# Patient Record
Sex: FEMALE | Race: OTHER | NOT HISPANIC OR LATINO
[De-identification: names, ages, dates, MRNs, and addresses within clinical notes are randomized per-mention and may not be internally consistent; named-entity substitution may affect disease eponyms.]

---

## 2017-01-31 ENCOUNTER — RESULT REVIEW (OUTPATIENT)
Age: 22
End: 2017-01-31

## 2017-02-01 ENCOUNTER — OUTPATIENT (OUTPATIENT)
Dept: OUTPATIENT SERVICES | Facility: HOSPITAL | Age: 22
LOS: 1 days | End: 2017-02-01
Payer: MEDICAID

## 2017-02-01 ENCOUNTER — APPOINTMENT (OUTPATIENT)
Dept: OBGYN | Facility: CLINIC | Age: 22
End: 2017-02-01

## 2017-02-01 DIAGNOSIS — F12.90 CANNABIS USE, UNSPECIFIED, UNCOMPLICATED: ICD-10-CM

## 2017-02-01 DIAGNOSIS — N76.0 ACUTE VAGINITIS: ICD-10-CM

## 2017-02-01 PROCEDURE — G0463: CPT | Mod: 25

## 2017-02-01 PROCEDURE — 76857 US EXAM PELVIC LIMITED: CPT

## 2017-02-13 ENCOUNTER — OTHER (OUTPATIENT)
Age: 22
End: 2017-02-13

## 2017-02-14 DIAGNOSIS — Z3A.01 LESS THAN 8 WEEKS GESTATION OF PREGNANCY: ICD-10-CM

## 2017-03-01 ENCOUNTER — APPOINTMENT (OUTPATIENT)
Dept: OBGYN | Facility: CLINIC | Age: 22
End: 2017-03-01

## 2017-03-08 ENCOUNTER — LABORATORY RESULT (OUTPATIENT)
Age: 22
End: 2017-03-08

## 2017-03-08 ENCOUNTER — APPOINTMENT (OUTPATIENT)
Dept: OBGYN | Facility: CLINIC | Age: 22
End: 2017-03-08

## 2017-03-08 ENCOUNTER — OUTPATIENT (OUTPATIENT)
Dept: OUTPATIENT SERVICES | Facility: HOSPITAL | Age: 22
LOS: 1 days | End: 2017-03-08
Payer: MEDICAID

## 2017-03-08 VITALS — DIASTOLIC BLOOD PRESSURE: 80 MMHG | SYSTOLIC BLOOD PRESSURE: 120 MMHG | WEIGHT: 182 LBS

## 2017-03-08 DIAGNOSIS — N76.0 ACUTE VAGINITIS: ICD-10-CM

## 2017-03-08 PROCEDURE — 86592 SYPHILIS TEST NON-TREP QUAL: CPT

## 2017-03-08 PROCEDURE — 87389 HIV-1 AG W/HIV-1&-2 AB AG IA: CPT

## 2017-03-08 PROCEDURE — 87340 HEPATITIS B SURFACE AG IA: CPT

## 2017-03-08 PROCEDURE — G0463: CPT

## 2017-03-08 PROCEDURE — 84443 ASSAY THYROID STIM HORMONE: CPT

## 2017-03-09 LAB
HBV SURFACE AG SER-ACNC: SIGNIFICANT CHANGE UP
HIV 1+2 AB+HIV1 P24 AG SERPL QL IA: SIGNIFICANT CHANGE UP
RPR SERPL-ACNC: SIGNIFICANT CHANGE UP

## 2017-03-10 LAB — TSH SERPL-MCNC: 1.55 UIU/ML — SIGNIFICANT CHANGE UP (ref 0.27–4.2)

## 2017-03-13 DIAGNOSIS — Z33.2 ENCOUNTER FOR ELECTIVE TERMINATION OF PREGNANCY: ICD-10-CM

## 2017-03-13 DIAGNOSIS — Z33.1 PREGNANT STATE, INCIDENTAL: ICD-10-CM

## 2017-03-17 ENCOUNTER — OUTPATIENT (OUTPATIENT)
Dept: OUTPATIENT SERVICES | Facility: HOSPITAL | Age: 22
LOS: 1 days | End: 2017-03-17
Payer: MEDICAID

## 2017-03-17 VITALS
HEART RATE: 75 BPM | DIASTOLIC BLOOD PRESSURE: 77 MMHG | WEIGHT: 181 LBS | SYSTOLIC BLOOD PRESSURE: 119 MMHG | HEIGHT: 60 IN | RESPIRATION RATE: 16 BRPM | TEMPERATURE: 99 F | OXYGEN SATURATION: 98 %

## 2017-03-17 DIAGNOSIS — Z32.2 ENCOUNTER FOR CHILDBIRTH INSTRUCTION: ICD-10-CM

## 2017-03-17 DIAGNOSIS — Z33.2 ENCOUNTER FOR ELECTIVE TERMINATION OF PREGNANCY: ICD-10-CM

## 2017-03-17 DIAGNOSIS — Z01.818 ENCOUNTER FOR OTHER PREPROCEDURAL EXAMINATION: ICD-10-CM

## 2017-03-17 LAB
BLD GP AB SCN SERPL QL: NEGATIVE — SIGNIFICANT CHANGE UP
HCT VFR BLD CALC: 39.4 % — SIGNIFICANT CHANGE UP (ref 34.5–45)
HGB BLD-MCNC: 13.4 G/DL — SIGNIFICANT CHANGE UP (ref 11.5–15.5)
MCHC RBC-ENTMCNC: 28.5 PG — SIGNIFICANT CHANGE UP (ref 27–34)
MCHC RBC-ENTMCNC: 34 GM/DL — SIGNIFICANT CHANGE UP (ref 32–36)
MCV RBC AUTO: 83.7 FL — SIGNIFICANT CHANGE UP (ref 80–100)
PLATELET # BLD AUTO: 226 K/UL — SIGNIFICANT CHANGE UP (ref 150–400)
RBC # BLD: 4.71 M/UL — SIGNIFICANT CHANGE UP (ref 3.8–5.2)
RBC # FLD: 13.2 % — SIGNIFICANT CHANGE UP (ref 10.3–14.5)
RH IG SCN BLD-IMP: POSITIVE — SIGNIFICANT CHANGE UP
WBC # BLD: 11.01 K/UL — HIGH (ref 3.8–10.5)
WBC # FLD AUTO: 11.01 K/UL — HIGH (ref 3.8–10.5)

## 2017-03-17 PROCEDURE — 86900 BLOOD TYPING SEROLOGIC ABO: CPT

## 2017-03-17 PROCEDURE — 86901 BLOOD TYPING SEROLOGIC RH(D): CPT

## 2017-03-17 PROCEDURE — 86850 RBC ANTIBODY SCREEN: CPT

## 2017-03-17 PROCEDURE — 85027 COMPLETE CBC AUTOMATED: CPT

## 2017-03-17 RX ORDER — CELECOXIB 200 MG/1
200 CAPSULE ORAL ONCE
Qty: 0 | Refills: 0 | Status: COMPLETED | OUTPATIENT
Start: 2017-03-20 | End: 2017-03-20

## 2017-03-17 RX ORDER — SODIUM CHLORIDE 9 MG/ML
3 INJECTION INTRAMUSCULAR; INTRAVENOUS; SUBCUTANEOUS EVERY 8 HOURS
Qty: 0 | Refills: 0 | Status: DISCONTINUED | OUTPATIENT
Start: 2017-03-20 | End: 2017-04-04

## 2017-03-17 RX ORDER — ACETAMINOPHEN 500 MG
975 TABLET ORAL ONCE
Qty: 0 | Refills: 0 | Status: COMPLETED | OUTPATIENT
Start: 2017-03-20 | End: 2017-03-20

## 2017-03-17 RX ORDER — LIDOCAINE HCL 20 MG/ML
0.2 VIAL (ML) INJECTION ONCE
Qty: 0 | Refills: 0 | Status: DISCONTINUED | OUTPATIENT
Start: 2017-03-20 | End: 2017-04-04

## 2017-03-17 NOTE — H&P PST ADULT - ATTENDING COMMENTS
14 wks for termination of pregnancy  denies cooersion and desires terminations  REviewed in depth--porcedure and possible complication as well as  manamgent of complications--ei meds and additional surgery  RTO 2 wks  motrin for pain  ED if F/C, heavy VB or pain    pt and boyfriend expressed understanding  Accepts blood products

## 2017-03-17 NOTE — H&P PST ADULT - NSANTHOSAYNRD_GEN_A_CORE
No. SOY screening performed.  STOP BANG Legend: 0-2 = LOW Risk; 3-4 = INTERMEDIATE Risk; 5-8 = HIGH Risk

## 2017-03-19 ENCOUNTER — RESULT REVIEW (OUTPATIENT)
Age: 22
End: 2017-03-19

## 2017-03-20 ENCOUNTER — APPOINTMENT (OUTPATIENT)
Dept: OBGYN | Facility: CLINIC | Age: 22
End: 2017-03-20

## 2017-03-20 ENCOUNTER — OUTPATIENT (OUTPATIENT)
Dept: OUTPATIENT SERVICES | Facility: HOSPITAL | Age: 22
LOS: 1 days | End: 2017-03-20
Payer: MEDICAID

## 2017-03-20 VITALS
WEIGHT: 181 LBS | SYSTOLIC BLOOD PRESSURE: 122 MMHG | HEART RATE: 89 BPM | HEIGHT: 60 IN | TEMPERATURE: 99 F | OXYGEN SATURATION: 100 % | RESPIRATION RATE: 20 BRPM | DIASTOLIC BLOOD PRESSURE: 85 MMHG

## 2017-03-20 VITALS
SYSTOLIC BLOOD PRESSURE: 97 MMHG | OXYGEN SATURATION: 100 % | DIASTOLIC BLOOD PRESSURE: 58 MMHG | RESPIRATION RATE: 16 BRPM | HEART RATE: 73 BPM

## 2017-03-20 DIAGNOSIS — Z32.2 ENCOUNTER FOR CHILDBIRTH INSTRUCTION: ICD-10-CM

## 2017-03-20 PROCEDURE — 88304 TISSUE EXAM BY PATHOLOGIST: CPT | Mod: 26

## 2017-03-20 PROCEDURE — 59841 INDUCED ABORTION DILAT&EVAC: CPT | Mod: GC

## 2017-03-20 PROCEDURE — 88304 TISSUE EXAM BY PATHOLOGIST: CPT

## 2017-03-20 PROCEDURE — 59841 INDUCED ABORTION DILAT&EVAC: CPT

## 2017-03-20 RX ORDER — OXYCODONE HYDROCHLORIDE 5 MG/1
5 TABLET ORAL ONCE
Qty: 0 | Refills: 0 | Status: DISCONTINUED | OUTPATIENT
Start: 2017-03-20 | End: 2017-03-20

## 2017-03-20 RX ORDER — CELECOXIB 200 MG/1
200 CAPSULE ORAL ONCE
Qty: 0 | Refills: 0 | Status: DISCONTINUED | OUTPATIENT
Start: 2017-03-20 | End: 2017-04-04

## 2017-03-20 RX ORDER — ONDANSETRON 8 MG/1
4 TABLET, FILM COATED ORAL ONCE
Qty: 0 | Refills: 0 | Status: DISCONTINUED | OUTPATIENT
Start: 2017-03-20 | End: 2017-04-04

## 2017-03-20 RX ORDER — SODIUM CHLORIDE 9 MG/ML
1000 INJECTION, SOLUTION INTRAVENOUS
Qty: 0 | Refills: 0 | Status: DISCONTINUED | OUTPATIENT
Start: 2017-03-20 | End: 2017-04-04

## 2017-03-20 RX ORDER — FAMOTIDINE 10 MG/ML
0 INJECTION INTRAVENOUS
Qty: 0 | Refills: 0 | COMMUNITY

## 2017-03-20 RX ADMIN — Medication 975 MILLIGRAM(S): at 14:23

## 2017-03-20 RX ADMIN — CELECOXIB 200 MILLIGRAM(S): 200 CAPSULE ORAL at 14:24

## 2017-03-20 NOTE — ASU DISCHARGE PLAN (ADULT/PEDIATRIC). - MEDICATION SUMMARY - MEDICATIONS TO TAKE
I will START or STAY ON the medications listed below when I get home from the hospital:    famotidine 20 mg oral tablet  --  by mouth as directed  -- Indication: For home med

## 2017-03-20 NOTE — ASU PREOP CHECKLIST - TO WHOM
[facility]/report received from [facility]

## 2017-03-20 NOTE — ASU DISCHARGE PLAN (ADULT/PEDIATRIC). - NOTIFY
Unable to Urinate/Persistent Nausea and Vomiting/Bleeding that does not stop/Inability to Tolerate Liquids or Foods/GYN Fever>100.4

## 2017-03-20 NOTE — ASU DISCHARGE PLAN (ADULT/PEDIATRIC). - SPECIAL INSTRUCTIONS
follow up at [address] [place] [address] in 2 weeks. call the office for an appt . tylenol and motrin for pain control.

## 2017-03-21 ENCOUNTER — TRANSCRIPTION ENCOUNTER (OUTPATIENT)
Age: 22
End: 2017-03-21

## 2017-03-30 LAB — SURGICAL PATHOLOGY STUDY: SIGNIFICANT CHANGE UP

## 2017-04-05 ENCOUNTER — LABORATORY RESULT (OUTPATIENT)
Age: 22
End: 2017-04-05

## 2017-04-05 ENCOUNTER — APPOINTMENT (OUTPATIENT)
Dept: OBGYN | Facility: CLINIC | Age: 22
End: 2017-04-05

## 2017-04-05 ENCOUNTER — RESULT CHARGE (OUTPATIENT)
Age: 22
End: 2017-04-05

## 2017-04-05 ENCOUNTER — OUTPATIENT (OUTPATIENT)
Dept: OUTPATIENT SERVICES | Facility: HOSPITAL | Age: 22
LOS: 1 days | End: 2017-04-05
Payer: MEDICAID

## 2017-04-05 DIAGNOSIS — N76.0 ACUTE VAGINITIS: ICD-10-CM

## 2017-04-05 PROCEDURE — 58300 INSERT INTRAUTERINE DEVICE: CPT

## 2017-04-05 PROCEDURE — 81025 URINE PREGNANCY TEST: CPT

## 2017-04-06 LAB
C TRACH RRNA SPEC QL NAA+PROBE: SIGNIFICANT CHANGE UP
N GONORRHOEA RRNA SPEC QL NAA+PROBE: SIGNIFICANT CHANGE UP
SPECIMEN SOURCE: SIGNIFICANT CHANGE UP

## 2017-04-10 DIAGNOSIS — Z33.2 ENCOUNTER FOR ELECTIVE TERMINATION OF PREGNANCY: ICD-10-CM

## 2017-04-10 DIAGNOSIS — Z33.1 PREGNANT STATE, INCIDENTAL: ICD-10-CM

## 2017-04-10 DIAGNOSIS — Z30.430 ENCOUNTER FOR INSERTION OF INTRAUTERINE CONTRACEPTIVE DEVICE: ICD-10-CM

## 2017-08-15 ENCOUNTER — LABORATORY RESULT (OUTPATIENT)
Age: 22
End: 2017-08-15

## 2017-08-15 ENCOUNTER — APPOINTMENT (OUTPATIENT)
Dept: OBGYN | Facility: CLINIC | Age: 22
End: 2017-08-15
Payer: MEDICAID

## 2017-08-15 ENCOUNTER — OUTPATIENT (OUTPATIENT)
Dept: OUTPATIENT SERVICES | Facility: HOSPITAL | Age: 22
LOS: 1 days | End: 2017-08-15
Payer: MEDICAID

## 2017-08-15 VITALS
SYSTOLIC BLOOD PRESSURE: 110 MMHG | HEIGHT: 63 IN | BODY MASS INDEX: 34.99 KG/M2 | WEIGHT: 197.5 LBS | DIASTOLIC BLOOD PRESSURE: 70 MMHG

## 2017-08-15 DIAGNOSIS — N76.0 ACUTE VAGINITIS: ICD-10-CM

## 2017-08-15 PROCEDURE — 58300 INSERT INTRAUTERINE DEVICE: CPT

## 2017-08-15 PROCEDURE — 81025 URINE PREGNANCY TEST: CPT

## 2017-08-30 DIAGNOSIS — Z30.430 ENCOUNTER FOR INSERTION OF INTRAUTERINE CONTRACEPTIVE DEVICE: ICD-10-CM

## 2017-09-15 ENCOUNTER — EMERGENCY (EMERGENCY)
Facility: HOSPITAL | Age: 22
LOS: 1 days | Discharge: HOME | End: 2017-09-15
Attending: EMERGENCY MEDICINE | Admitting: EMERGENCY MEDICINE
Payer: MEDICAID

## 2017-09-15 VITALS
OXYGEN SATURATION: 99 % | SYSTOLIC BLOOD PRESSURE: 127 MMHG | HEART RATE: 104 BPM | RESPIRATION RATE: 16 BRPM | TEMPERATURE: 99 F | WEIGHT: 190.04 LBS | DIASTOLIC BLOOD PRESSURE: 62 MMHG

## 2017-09-15 VITALS
HEART RATE: 77 BPM | RESPIRATION RATE: 17 BRPM | TEMPERATURE: 98 F | DIASTOLIC BLOOD PRESSURE: 67 MMHG | SYSTOLIC BLOOD PRESSURE: 104 MMHG | OXYGEN SATURATION: 98 %

## 2017-09-15 DIAGNOSIS — R06.02 SHORTNESS OF BREATH: ICD-10-CM
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
## 2017-09-15 DIAGNOSIS — F17.210 NICOTINE DEPENDENCE, CIGARETTES, UNCOMPLICATED: ICD-10-CM

## 2017-09-15 DIAGNOSIS — T78.40XA ALLERGY, UNSPECIFIED, INITIAL ENCOUNTER: ICD-10-CM

## 2017-09-15 PROCEDURE — 96374 THER/PROPH/DIAG INJ IV PUSH: CPT

## 2017-09-15 PROCEDURE — 99284 EMERGENCY DEPT VISIT MOD MDM: CPT

## 2017-09-15 PROCEDURE — 96375 TX/PRO/DX INJ NEW DRUG ADDON: CPT

## 2017-09-15 PROCEDURE — 99284 EMERGENCY DEPT VISIT MOD MDM: CPT | Mod: 25

## 2017-09-15 RX ORDER — FAMOTIDINE 10 MG/ML
20 INJECTION INTRAVENOUS ONCE
Qty: 0 | Refills: 0 | Status: COMPLETED | OUTPATIENT
Start: 2017-09-15 | End: 2017-09-15

## 2017-09-15 RX ORDER — DIPHENHYDRAMINE HCL 50 MG
50 CAPSULE ORAL ONCE
Qty: 0 | Refills: 0 | Status: COMPLETED | OUTPATIENT
Start: 2017-09-15 | End: 2017-09-15

## 2017-09-15 RX ORDER — FAMOTIDINE 10 MG/ML
1 INJECTION INTRAVENOUS
Qty: 20 | Refills: 0 | OUTPATIENT
Start: 2017-09-15 | End: 2017-09-25

## 2017-09-15 RX ORDER — DIPHENHYDRAMINE HCL 50 MG
1 CAPSULE ORAL
Qty: 20 | Refills: 0 | OUTPATIENT
Start: 2017-09-15 | End: 2017-09-20

## 2017-09-15 RX ORDER — EPINEPHRINE 0.3 MG/.3ML
1 INJECTION INTRAMUSCULAR; SUBCUTANEOUS
Qty: 1 | Refills: 0 | OUTPATIENT
Start: 2017-09-15

## 2017-09-15 RX ORDER — SODIUM CHLORIDE 9 MG/ML
1000 INJECTION INTRAMUSCULAR; INTRAVENOUS; SUBCUTANEOUS ONCE
Qty: 0 | Refills: 0 | Status: COMPLETED | OUTPATIENT
Start: 2017-09-15 | End: 2017-09-15

## 2017-09-15 RX ORDER — SODIUM CHLORIDE 9 MG/ML
3 INJECTION INTRAMUSCULAR; INTRAVENOUS; SUBCUTANEOUS ONCE
Qty: 0 | Refills: 0 | Status: COMPLETED | OUTPATIENT
Start: 2017-09-15 | End: 2017-09-15

## 2017-09-15 RX ADMIN — SODIUM CHLORIDE 1000 MILLILITER(S): 9 INJECTION INTRAMUSCULAR; INTRAVENOUS; SUBCUTANEOUS at 21:34

## 2017-09-15 RX ADMIN — FAMOTIDINE 20 MILLIGRAM(S): 10 INJECTION INTRAVENOUS at 21:34

## 2017-09-15 RX ADMIN — Medication 50 MILLIGRAM(S): at 21:34

## 2017-09-15 RX ADMIN — SODIUM CHLORIDE 3 MILLILITER(S): 9 INJECTION INTRAMUSCULAR; INTRAVENOUS; SUBCUTANEOUS at 21:34

## 2017-09-15 NOTE — ED PROVIDER NOTE - PROGRESS NOTE DETAILS
Pt observed for several hours with no further signs of anaphylaxis.  Will discharge home with outpatient follow up.

## 2017-09-15 NOTE — ED PROVIDER NOTE - OBJECTIVE STATEMENT
Pt is a 21 yo female who presents to the ED with a cc of allergic reaction.  Pt reports that she suffers from allergies but despite being seen by an allergist they have yet to determine what she is exactly allergic to.  She states that she will intermittently develop diffuse hives, and one time had an anaphylactic reaction so she was prescribed. Pt is a 23 yo female who presents to the ED with a cc of allergic reaction.  Pt reports that she suffers from allergies but despite being seen by an allergist they have yet to determine what she is exactly allergic to.  She states that she will intermittently develop diffuse hives, and one time had an anaphylactic reaction so she was prescribed an epi pen.  Pt has used this one prior time. She reports that today after eating tacos at [employer] she developed chest tightness and felt like she couldn't breath.  Pt has had [employer] in the past and never experienced these symptoms prior.  She was concerned that this was an allergic reaction so she gave herself an injection of epi and pt reported that symptoms resolved.  Denies fever, chills, N/V/D/C, CP, abd pain, difficultly swallowing, wheezing.  LMP last week.

## 2017-09-15 NOTE — ED PROVIDER NOTE - PLAN OF CARE
Return to the ED for any new or worsening symptoms   Take your medication as prescribed  Follow up with your PMD in 2-3 days for a recheck   Advance activity as tolerated

## 2017-09-15 NOTE — ED PROVIDER NOTE - CARE PLAN
Principal Discharge DX:	Allergic reaction, initial encounter  Instructions for follow-up, activity and diet:	Return to the ED for any new or worsening symptoms   Take your medication as prescribed  Follow up with your PMD in 2-3 days for a recheck   Advance activity as tolerated

## 2017-09-15 NOTE — ED ADULT NURSE NOTE - OBJECTIVE STATEMENT
pt stated she was eating tacos at [employer] and became suddenly SOB, no itching no hives no tongue or facial swelling

## 2017-09-15 NOTE — ED ADULT NURSE NOTE - CHPI ED SYMPTOMS NEG
no difficulty breathing/no cough/no wheezing/no vomiting/no nausea/no difficulty swallowing/no throat itching/no rash/no shortness of breath/no swelling of face, tongue

## 2017-09-15 NOTE — ED PROVIDER NOTE - CHPI ED SYMPTOMS NEG
no throat itching/no nausea/no rash/no wheezing/no cough/no vomiting/no swelling of face, tongue/no difficulty swallowing

## 2017-09-15 NOTE — ED PROVIDER NOTE - CHIEF COMPLAINT
The patient is a 22y Female complaining of see chief complaint quote. The patient is a 22y Female complaining of allergic reaction

## 2017-12-28 ENCOUNTER — OUTPATIENT (OUTPATIENT)
Dept: OUTPATIENT SERVICES | Facility: HOSPITAL | Age: 22
LOS: 1 days | End: 2017-12-28
Payer: MEDICAID

## 2017-12-28 ENCOUNTER — LABORATORY RESULT (OUTPATIENT)
Age: 22
End: 2017-12-28

## 2017-12-28 ENCOUNTER — RESULT CHARGE (OUTPATIENT)
Age: 22
End: 2017-12-28

## 2017-12-28 ENCOUNTER — APPOINTMENT (OUTPATIENT)
Dept: OBGYN | Facility: CLINIC | Age: 22
End: 2017-12-28
Payer: MEDICAID

## 2017-12-28 VITALS — WEIGHT: 199 LBS | DIASTOLIC BLOOD PRESSURE: 70 MMHG | SYSTOLIC BLOOD PRESSURE: 106 MMHG

## 2017-12-28 DIAGNOSIS — N76.0 ACUTE VAGINITIS: ICD-10-CM

## 2017-12-28 PROCEDURE — G0463: CPT

## 2017-12-28 PROCEDURE — 84702 CHORIONIC GONADOTROPIN TEST: CPT

## 2017-12-28 PROCEDURE — 99213 OFFICE O/P EST LOW 20 MIN: CPT | Mod: NC

## 2017-12-29 LAB — HCG SERPL-ACNC: <1 MIU/ML — SIGNIFICANT CHANGE UP

## 2018-01-10 DIAGNOSIS — Z97.5 PRESENCE OF (INTRAUTERINE) CONTRACEPTIVE DEVICE: ICD-10-CM

## 2018-01-15 ENCOUNTER — EMERGENCY (EMERGENCY)
Facility: HOSPITAL | Age: 23
LOS: 1 days | Discharge: HOME | End: 2018-01-15
Attending: EMERGENCY MEDICINE | Admitting: EMERGENCY MEDICINE
Payer: MEDICAID

## 2018-01-15 VITALS
HEART RATE: 54 BPM | TEMPERATURE: 99 F | RESPIRATION RATE: 16 BRPM | OXYGEN SATURATION: 100 % | SYSTOLIC BLOOD PRESSURE: 116 MMHG | DIASTOLIC BLOOD PRESSURE: 76 MMHG

## 2018-01-15 VITALS
DIASTOLIC BLOOD PRESSURE: 74 MMHG | OXYGEN SATURATION: 100 % | RESPIRATION RATE: 16 BRPM | SYSTOLIC BLOOD PRESSURE: 116 MMHG | HEART RATE: 56 BPM | TEMPERATURE: 98 F

## 2018-01-15 PROCEDURE — 99283 EMERGENCY DEPT VISIT LOW MDM: CPT | Mod: 25

## 2018-01-15 PROCEDURE — 99053 MED SERV 10PM-8AM 24 HR FAC: CPT

## 2018-01-15 PROCEDURE — 99283 EMERGENCY DEPT VISIT LOW MDM: CPT

## 2018-01-15 RX ORDER — IBUPROFEN 200 MG
600 TABLET ORAL ONCE
Qty: 0 | Refills: 0 | Status: COMPLETED | OUTPATIENT
Start: 2018-01-15 | End: 2018-01-15

## 2018-01-15 RX ADMIN — Medication 600 MILLIGRAM(S): at 01:09

## 2018-01-15 NOTE — ED PROVIDER NOTE - PROGRESS NOTE DETAILS
QIU improved. Symptoms improved after Motrin. Non-focal neuro exam 5 hours s/p injury. Mother to observe patient for next 24 hours. Warning signs sand concussion precautions discussed. JALEN

## 2018-01-15 NOTE — ED PROVIDER NOTE - PHYSICAL EXAMINATION
Gen: NAD  Eyes:  sclerae white, no icterus  ENT: Moist mucous membranes. No exudates  Neck: supple, no LAD, mass or goiter, trachea midline  CV: RRR. Audible S1 and S2. No murmurs, rubs, gallops, S3, nor S4  Pulm: Clear to auscultation bilaterally. No wheezes, rales, or rhonchi  Abd: BS+, nondistended, No tenderness to palpation  Musculoskeletal:  No midline spinal TTP  Skin: no lesions or scars noted  Psych: mood good, affect full range and congruent with mood.  Neurologic: AAOx3, no focal neuro deficits, normal gait
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
## 2018-01-31 LAB — HCG UR QL: NEGATIVE

## 2018-07-27 PROBLEM — F12.90 USES MARIJUANA: Status: ACTIVE | Noted: 2017-02-01

## 2018-08-01 ENCOUNTER — OUTPATIENT (OUTPATIENT)
Dept: OUTPATIENT SERVICES | Facility: HOSPITAL | Age: 23
LOS: 1 days | End: 2018-08-01
Payer: MEDICAID

## 2018-08-01 PROCEDURE — G9001: CPT

## 2018-08-18 ENCOUNTER — EMERGENCY (EMERGENCY)
Facility: HOSPITAL | Age: 23
LOS: 1 days | Discharge: HOME | End: 2018-08-18
Attending: EMERGENCY MEDICINE
Payer: MEDICAID

## 2018-08-18 VITALS
SYSTOLIC BLOOD PRESSURE: 113 MMHG | OXYGEN SATURATION: 99 % | HEART RATE: 60 BPM | DIASTOLIC BLOOD PRESSURE: 69 MMHG | TEMPERATURE: 98 F | RESPIRATION RATE: 18 BRPM

## 2018-08-18 VITALS
RESPIRATION RATE: 18 BRPM | OXYGEN SATURATION: 100 % | SYSTOLIC BLOOD PRESSURE: 118 MMHG | HEART RATE: 60 BPM | DIASTOLIC BLOOD PRESSURE: 57 MMHG

## 2018-08-18 LAB
APPEARANCE UR: ABNORMAL
BACTERIA # UR AUTO: ABNORMAL /HPF
BILIRUB UR-MCNC: NEGATIVE — SIGNIFICANT CHANGE UP
COLOR SPEC: SIGNIFICANT CHANGE UP
DIFF PNL FLD: NEGATIVE — SIGNIFICANT CHANGE UP
EPI CELLS # UR: SIGNIFICANT CHANGE UP /HPF
GLUCOSE UR QL: NEGATIVE — SIGNIFICANT CHANGE UP
KETONES UR-MCNC: NEGATIVE — SIGNIFICANT CHANGE UP
LEUKOCYTE ESTERASE UR-ACNC: ABNORMAL
NITRITE UR-MCNC: NEGATIVE — SIGNIFICANT CHANGE UP
PH UR: 7.5 — SIGNIFICANT CHANGE UP (ref 5–8)
PROT UR-MCNC: SIGNIFICANT CHANGE UP
SP GR SPEC: 1.02 — SIGNIFICANT CHANGE UP (ref 1.01–1.02)
UROBILINOGEN FLD QL: NEGATIVE — SIGNIFICANT CHANGE UP
WBC UR QL: SIGNIFICANT CHANGE UP /HPF (ref 0–5)

## 2018-08-18 PROCEDURE — 99284 EMERGENCY DEPT VISIT MOD MDM: CPT | Mod: 25

## 2018-08-18 PROCEDURE — 99284 EMERGENCY DEPT VISIT MOD MDM: CPT

## 2018-08-18 PROCEDURE — 87086 URINE CULTURE/COLONY COUNT: CPT

## 2018-08-18 PROCEDURE — 81001 URINALYSIS AUTO W/SCOPE: CPT

## 2018-08-18 RX ORDER — CEPHALEXIN 500 MG
1 CAPSULE ORAL
Qty: 56 | Refills: 0 | OUTPATIENT
Start: 2018-08-18 | End: 2018-08-31

## 2018-08-18 RX ORDER — ACETAMINOPHEN 500 MG
975 TABLET ORAL ONCE
Qty: 0 | Refills: 0 | Status: COMPLETED | OUTPATIENT
Start: 2018-08-18 | End: 2018-08-18

## 2018-08-18 RX ORDER — IBUPROFEN 200 MG
600 TABLET ORAL ONCE
Qty: 0 | Refills: 0 | Status: COMPLETED | OUTPATIENT
Start: 2018-08-18 | End: 2018-08-18

## 2018-08-18 RX ADMIN — Medication 975 MILLIGRAM(S): at 04:57

## 2018-08-18 RX ADMIN — Medication 600 MILLIGRAM(S): at 04:57

## 2018-08-18 NOTE — ED PROVIDER NOTE - PROGRESS NOTE DETAILS
Frankie Carpio DO PGY1 - discussed results with pt. Told is uti with probably pyelonephritis. Can be dc'ed home with strict f/u precautions. Pt agrees

## 2018-08-18 NOTE — ED PROVIDER NOTE - CARE PLAN
Principal Discharge DX:	Pyelonephritis  Assessment and plan of treatment:	My diagnosis was explained to me by Dr. Carpio. Rest, drink plenty of fluids.  Advance activity as tolerated.  Continue all previously prescribed medications as directed.  Follow up with your primary care physician in 24-48 hours- bring copies of your results if you were given. If you do not have a primary care physician, call our clinic at [phone], or call [phone].  Return to the Emergency Department for worsening or persistent symptoms OR ANY NEW OR CONCERNING SYMPTOMS including but not limited to fever, worsening back pain, inability to eat or drink, development of persistent diarrhea, or any other concerns to you. Principal Discharge DX:	UTI (urinary tract infection)  Assessment and plan of treatment:	My diagnosis was explained to me by Dr. Carpio. Rest, drink plenty of fluids.  Advance activity as tolerated.  Continue all previously prescribed medications as directed.  Follow up with your primary care physician in 24-48 hours- bring copies of your results if you were given. If you do not have a primary care physician, call our clinic at [phone], or call [phone].  Return to the Emergency Department for worsening or persistent symptoms OR ANY NEW OR CONCERNING SYMPTOMS including but not limited to fever, worsening back pain, inability to eat or drink, development of persistent diarrhea, or any other concerns to you.

## 2018-08-18 NOTE — ED PROVIDER NOTE - ATTENDING CONTRIBUTION TO CARE
23 yof no pmhx no surg hx presents w 4-5 days of burning w urination and 2 days of lower abd pain. states at start possibly saw sm amt of blood in urine, though she states she isnt sure it was in her urine as she intermittently has mild vag bleed after having IUD placed 1 yr ago w subsequent irreg periods. no f/c. states dysuria has continued. mild nauesa, no vomiting. states pain radiates mildly to right back.     ROS:   constitutional - no fever, no chills  eyes - no visual changes, no redness  eent - no sore throat, no nasal congestion  cvs - no chest pain, no leg swelling  resp - no shortness of breath, no cough  gi - + abdominal pain, no vomiting, no diarrhea  gu - + dysuria, + hematuria  msk - no acute back pain, no joint swelling  skin - no rashes, no jaundice  neuro - no headache, no focal weakness  psych - no acute mental health issue     Physical Exam:   constitutional - well appearing, awake and alert, oriented x3  head - no external evidence of trauma  cvs - rrr, no murmurs, no peripheral edema  resp - breath sounds clear and equal bilat  gi - abdomen soft w minimal suprapubic tenderness, no rigidity, guarding or rebound, bowel sounds present  msk - moving all extremities spontaneously  neuro - alert and oriented x3, no focal deficits, CNs 2-12 grossly intact  skin- no jaundice, warm and dry  psych - mood and affect wnl, no apparent risk to self or others   gu - no cmt, no adnexal tenderness    ? uti vs mild early pyelo vs appy. urine + for uti, cx pending. pt afebrile, only minimal abd tendernress more likely related to uti/ early pyelo. reassessed after meds in ED = pt states pain is completely resolved. no ongoing RLQ tenderness. gave pt extensive return precautions and informed that early appendicitis could remain a possibility. additional verbal instructions regarding diagnosis, return precautions and follow up plan given to pt and/or family. RANDOLPH Baumann MD

## 2018-08-18 NOTE — ED ADULT NURSE NOTE - NSIMPLEMENTINTERV_GEN_ALL_ED
Implemented All Universal Safety Interventions:  [place] to call system. Call bell, personal items and telephone within reach. Instruct patient to call for assistance. Room bathroom lighting operational. Non-slip footwear when patient is off stretcher. Physically safe environment: no spills, clutter or unnecessary equipment. Stretcher in lowest position, wheels locked, appropriate side rails in place.

## 2018-08-18 NOTE — ED PROVIDER NOTE - OBJECTIVE STATEMENT
24yo female no pmx presenting with dysuria since monday which progressed to lower R back pain and some suprapubic abd pain and slight nausea but still tolerating PO. No vomiting, some episodes of diarrhea. No fever, chills, chest pain, sob.

## 2018-08-18 NOTE — ED PROVIDER NOTE - PLAN OF CARE
My diagnosis was explained to me by Dr. Carpio. Rest, drink plenty of fluids.  Advance activity as tolerated.  Continue all previously prescribed medications as directed.  Follow up with your primary care physician in 24-48 hours- bring copies of your results if you were given. If you do not have a primary care physician, call our clinic at [phone], or call [phone].  Return to the Emergency Department for worsening or persistent symptoms OR ANY NEW OR CONCERNING SYMPTOMS including but not limited to fever, worsening back pain, inability to eat or drink, development of persistent diarrhea, or any other concerns to you.

## 2018-08-18 NOTE — ED PROVIDER NOTE - PHYSICAL EXAMINATION
Gen: Well appearing, NAD  Head: NCAT  HEENT: PERRL, MMM, normal conjunctiva, anicteric, neck supple  Lung: CTAB, no adventitious sounds  CV: RRR, no murmurs  Abd: soft, NTND, no rebound or guarding, no CVAT  MSK: No edema, no visible deformities  Neuro: CN II-XII grossly intact. 5/5 strength and normal sensation in all extremities. Ambulatory with stable gait.   Skin: Warm and dry, no evidence of rash  Psych: normal mood and affect Gen: Well appearing, NAD  Head: NCAT  HEENT: PERRL, MMM, normal conjunctiva, anicteric, neck supple  Lung: CTAB, no adventitious sounds  CV: RRR, no murmurs  Abd: soft, tender to suprapubic, no rebound or guarding, no CVAT  MSK: No edema, no visible deformities  Neuro: CN II-XII grossly intact. 5/5 strength and normal sensation in all extremities. Ambulatory with stable gait.   Skin: Warm and dry, no evidence of rash  Psych: normal mood and affect

## 2018-08-18 NOTE — ED PROVIDER NOTE - NS ED ROS FT
AS PER HPI, otherwise:  Constitutional: no fever, no headache, no lightheadedness, no dizziness  Eyes: no conjunctivitis, no vision changes  Ears: no ear pain   Nose: no nasal congestion, Mouth/Throat: no throat pain, Neck: no stiffness  Cardiovascular: no chest pain, no palpitations  Chest: no cough, no shortness of breath  Gastrointestinal: see hpi  MSK: no joint pain, no swelling of extremities  : see hpi  Skin: no rash

## 2018-08-18 NOTE — ED PROVIDER NOTE - MEDICAL DECISION MAKING DETAILS
22yo F no pmx with dysuria and L back pain and slight nausea. Likely Uti/pyelo? Will send urine and w/u and dispo accordingly.

## 2018-08-18 NOTE — ED ADULT NURSE NOTE - OBJECTIVE STATEMENT
Sharp RLQ pain x3 days. Constant. Buring with urination. Noticed small amount of blood in urine on Monday. Able to tolerate PO intake. Nauseous. Diarrhea, since Tuesday, approximately 4 episodes a day. rates pain 7/10. Has been taking Tylenol for pain- finds partial relief, last dose 500mg at 11pm. 22y/o Female presented to the ED from home with complaint of abdominal pain. A&Ox3, ambulatory. Patient reports constant Sharp RLQ pain x3 days. Reports  burning with urination. Noticed small amount of blood in urine on Monday. Able to tolerate PO intake. Repots nausea/ diarrhea since Tuesday, approximately 4 episodes a day. Rates pain 7/10. Has been taking Tylenol for pain- finds partial relief, last dose 500mg at 11pm. Patient states she has IUD placed, unsure if blood in urine is from dislodged IUD. Denies chest pain, palpitations, headache, fever, chills, vomiting, weakness, numbness, tingling. Abdomen is soft, nondistended, RLQ tenderness upon palpation. No rebound tenderness noted. Lung sounds equal/clear bilaterally. Cap refill < 2 sec. 22y/o Female presented to the ED from home with complaint of abdominal pain. A&Ox3, ambulatory. Patient reports constant Sharp RLQ pain x3 days. Reports  burning with urination. Noticed small amount of blood in urine on Monday. Able to tolerate PO intake. Repots nausea/ diarrhea since Tuesday, approximately 4 episodes a day. Rates pain 7/10. Has been taking Tylenol for pain- finds partial relief, last dose 500mg at 11pm. Patient states she has IUD placed (placed 1 year ago), unsure if blood in urine is from dislodged IUD. Denies chest pain, palpitations, headache, fever, chills, vomiting, weakness, numbness, tingling. Abdomen is soft, nondistended, RLQ tenderness upon palpation. No rebound tenderness noted. Lung sounds equal/clear bilaterally. Cap refill < 2 sec.

## 2018-08-19 LAB
CULTURE RESULTS: SIGNIFICANT CHANGE UP
SPECIMEN SOURCE: SIGNIFICANT CHANGE UP

## 2018-08-22 DIAGNOSIS — Z71.89 OTHER SPECIFIED COUNSELING: ICD-10-CM

## 2018-09-19 ENCOUNTER — APPOINTMENT (OUTPATIENT)
Dept: INTERNAL MEDICINE | Facility: CLINIC | Age: 23
End: 2018-09-19

## 2018-10-03 ENCOUNTER — LABORATORY RESULT (OUTPATIENT)
Age: 23
End: 2018-10-03

## 2018-10-03 ENCOUNTER — APPOINTMENT (OUTPATIENT)
Dept: INTERNAL MEDICINE | Facility: CLINIC | Age: 23
End: 2018-10-03
Payer: MEDICAID

## 2018-10-03 ENCOUNTER — OUTPATIENT (OUTPATIENT)
Dept: OUTPATIENT SERVICES | Facility: HOSPITAL | Age: 23
LOS: 1 days | End: 2018-10-03
Payer: MEDICAID

## 2018-10-03 VITALS
SYSTOLIC BLOOD PRESSURE: 110 MMHG | BODY MASS INDEX: 32.07 KG/M2 | WEIGHT: 181 LBS | HEIGHT: 63 IN | DIASTOLIC BLOOD PRESSURE: 80 MMHG

## 2018-10-03 DIAGNOSIS — F17.290 NICOTINE DEPENDENCE, OTHER TOBACCO PRODUCT, UNCOMPLICATED: ICD-10-CM

## 2018-10-03 DIAGNOSIS — I10 ESSENTIAL (PRIMARY) HYPERTENSION: ICD-10-CM

## 2018-10-03 PROCEDURE — G0463: CPT

## 2018-10-03 PROCEDURE — 99214 OFFICE O/P EST MOD 30 MIN: CPT | Mod: GC

## 2018-10-03 NOTE — PHYSICAL EXAM
[No Acute Distress] : no acute distress [Well Nourished] : well nourished [Normal Sclera/Conjunctiva] : normal sclera/conjunctiva [Normal Outer Ear/Nose] : the outer ears and nose were normal in appearance [No JVD] : no jugular venous distention [No Respiratory Distress] : no respiratory distress  [Clear to Auscultation] : lungs were clear to auscultation bilaterally [No Accessory Muscle Use] : no accessory muscle use [Normal Rate] : normal rate  [Regular Rhythm] : with a regular rhythm [Normal S1, S2] : normal S1 and S2 [No Murmur] : no murmur heard [No Edema] : there was no peripheral edema [Soft] : abdomen soft [Non Tender] : non-tender [Non-distended] : non-distended [No Masses] : no abdominal mass palpated [No Joint Swelling] : no joint swelling [No Rash] : no rash

## 2018-10-04 NOTE — HISTORY OF PRESENT ILLNESS
[de-identified] : 22 yo F w/ no PMH presents for new CPE. Patient has no complaints. Reports she is trying to lose weight. She has lost 10 lbs over the last few months. Tries to eat a healthy diet and exercise regularly. She is sexually active, uses condoms always and has IUD. Seldom drinks alcohol and smokes hookah 4 times per week. Denies cigarette use. Currently in school and working part time job. Uses no supplement and takes no medications. No CP, SOB, abdominal pain, heat or cold intolerance, change in vision, or rashes.

## 2018-10-04 NOTE — ASSESSMENT
[FreeTextEntry1] : \par 22 yo w/ no PMH presents for new CPE and work physical to be filled out.\par #HCM\par -will out patient's work form today and fax over once patient provides fax number\par -Flu vaccine today\par -Rubella, Rubeola titers sent\par -Quant gold today\par -GC/chlamydia neg in 2017\par \par RTC as needed\par \par

## 2018-10-09 DIAGNOSIS — E66.3 OVERWEIGHT: ICD-10-CM

## 2018-12-07 ENCOUNTER — APPOINTMENT (OUTPATIENT)
Dept: INTERNAL MEDICINE | Facility: CLINIC | Age: 23
End: 2018-12-07

## 2018-12-07 LAB — HCG UR QL: NEGATIVE

## 2018-12-07 NOTE — ASSESSMENT
[FreeTextEntry1] : 23 F w/ chlamydia s/p tx (2016), D/C for elective  (2016, and obesity  PMH who presents for an acute patient visit. \par \par #nausea/vomiting\par ddx: gastroenteritis, hypylori, \par - urine pregnancy test today \par - c/w fluid intake as toleated\par - tylenol prn\par -

## 2018-12-07 NOTE — HISTORY OF PRESENT ILLNESS
[FreeTextEntry8] : 23 F w/ chlamydia s/p tx (2016), D/C for elective  (2016, and obesity  PMH who presents for an acute patient visit. \par \par Patient is presenting with multiple episodes of vomiting for the past 3 days. Patient was in her usual state of health until 3 days ago when she felt nausea followed by multiple episodes of [NBNB vomiting] [associated with abdominal pain]. She denies [fevers, chills, chest pain, URI sx] She denies recent [sick contacts, travel, or eating unusual foods]. She denies recent alcohol use. Her last LMP was [ ] and endorses consistently taking her OCPS.  Yesterday her vomiting change in nature with [streaks? of blood]. She endorses [being able to tolerate liquids].

## 2019-07-02 ENCOUNTER — APPOINTMENT (OUTPATIENT)
Dept: OBGYN | Facility: CLINIC | Age: 24
End: 2019-07-02

## 2019-07-23 ENCOUNTER — APPOINTMENT (OUTPATIENT)
Dept: OBGYN | Facility: CLINIC | Age: 24
End: 2019-07-23

## 2020-02-10 ENCOUNTER — EMERGENCY (EMERGENCY)
Facility: HOSPITAL | Age: 25
LOS: 1 days | Discharge: HOME | End: 2020-02-10
Attending: EMERGENCY MEDICINE
Payer: SELF-PAY

## 2020-02-10 VITALS
SYSTOLIC BLOOD PRESSURE: 111 MMHG | OXYGEN SATURATION: 99 % | DIASTOLIC BLOOD PRESSURE: 60 MMHG | RESPIRATION RATE: 18 BRPM | TEMPERATURE: 98 F | HEART RATE: 72 BPM

## 2020-02-10 VITALS
HEART RATE: 78 BPM | DIASTOLIC BLOOD PRESSURE: 62 MMHG | OXYGEN SATURATION: 97 % | TEMPERATURE: 98 F | HEIGHT: 63 IN | SYSTOLIC BLOOD PRESSURE: 114 MMHG | WEIGHT: 182.1 LBS | RESPIRATION RATE: 16 BRPM

## 2020-02-10 LAB
ALBUMIN SERPL ELPH-MCNC: 4.3 G/DL — SIGNIFICANT CHANGE UP (ref 3.3–5)
ALP SERPL-CCNC: 88 U/L — SIGNIFICANT CHANGE UP (ref 40–120)
ALT FLD-CCNC: 41 U/L — SIGNIFICANT CHANGE UP (ref 10–45)
ANION GAP SERPL CALC-SCNC: 11 MMOL/L — SIGNIFICANT CHANGE UP (ref 5–17)
APPEARANCE UR: CLEAR — SIGNIFICANT CHANGE UP
AST SERPL-CCNC: 44 U/L — HIGH (ref 10–40)
BACTERIA # UR AUTO: ABNORMAL
BASOPHILS # BLD AUTO: 0.04 K/UL — SIGNIFICANT CHANGE UP (ref 0–0.2)
BASOPHILS NFR BLD AUTO: 0.4 % — SIGNIFICANT CHANGE UP (ref 0–2)
BILIRUB SERPL-MCNC: 0.2 MG/DL — SIGNIFICANT CHANGE UP (ref 0.2–1.2)
BILIRUB UR-MCNC: NEGATIVE — SIGNIFICANT CHANGE UP
BUN SERPL-MCNC: 12 MG/DL — SIGNIFICANT CHANGE UP (ref 7–23)
CALCIUM SERPL-MCNC: 9.3 MG/DL — SIGNIFICANT CHANGE UP (ref 8.4–10.5)
CHLORIDE SERPL-SCNC: 105 MMOL/L — SIGNIFICANT CHANGE UP (ref 96–108)
CO2 SERPL-SCNC: 25 MMOL/L — SIGNIFICANT CHANGE UP (ref 22–31)
COLOR SPEC: SIGNIFICANT CHANGE UP
CREAT SERPL-MCNC: 0.52 MG/DL — SIGNIFICANT CHANGE UP (ref 0.5–1.3)
DIFF PNL FLD: NEGATIVE — SIGNIFICANT CHANGE UP
EOSINOPHIL # BLD AUTO: 0.14 K/UL — SIGNIFICANT CHANGE UP (ref 0–0.5)
EOSINOPHIL NFR BLD AUTO: 1.4 % — SIGNIFICANT CHANGE UP (ref 0–6)
EPI CELLS # UR: 3 — SIGNIFICANT CHANGE UP
GLUCOSE SERPL-MCNC: 103 MG/DL — HIGH (ref 70–99)
GLUCOSE UR QL: NEGATIVE — SIGNIFICANT CHANGE UP
HCG UR QL: NEGATIVE — SIGNIFICANT CHANGE UP
HCT VFR BLD CALC: 41.4 % — SIGNIFICANT CHANGE UP (ref 34.5–45)
HGB BLD-MCNC: 13.7 G/DL — SIGNIFICANT CHANGE UP (ref 11.5–15.5)
HIV 1 & 2 AB SERPL IA.RAPID: SIGNIFICANT CHANGE UP
HYALINE CASTS # UR AUTO: 0 /LPF — SIGNIFICANT CHANGE UP (ref 0–7)
IMM GRANULOCYTES NFR BLD AUTO: 0.6 % — SIGNIFICANT CHANGE UP (ref 0–1.5)
KETONES UR-MCNC: NEGATIVE — SIGNIFICANT CHANGE UP
LEUKOCYTE ESTERASE UR-ACNC: NEGATIVE — SIGNIFICANT CHANGE UP
LYMPHOCYTES # BLD AUTO: 3.09 K/UL — SIGNIFICANT CHANGE UP (ref 1–3.3)
LYMPHOCYTES # BLD AUTO: 31.1 % — SIGNIFICANT CHANGE UP (ref 13–44)
MCHC RBC-ENTMCNC: 29.3 PG — SIGNIFICANT CHANGE UP (ref 27–34)
MCHC RBC-ENTMCNC: 33.1 GM/DL — SIGNIFICANT CHANGE UP (ref 32–36)
MCV RBC AUTO: 88.5 FL — SIGNIFICANT CHANGE UP (ref 80–100)
MONOCYTES # BLD AUTO: 0.65 K/UL — SIGNIFICANT CHANGE UP (ref 0–0.9)
MONOCYTES NFR BLD AUTO: 6.5 % — SIGNIFICANT CHANGE UP (ref 2–14)
NEUTROPHILS # BLD AUTO: 5.96 K/UL — SIGNIFICANT CHANGE UP (ref 1.8–7.4)
NEUTROPHILS NFR BLD AUTO: 60 % — SIGNIFICANT CHANGE UP (ref 43–77)
NITRITE UR-MCNC: NEGATIVE — SIGNIFICANT CHANGE UP
NRBC # BLD: 0 /100 WBCS — SIGNIFICANT CHANGE UP (ref 0–0)
PH UR: 6 — SIGNIFICANT CHANGE UP (ref 5–8)
PLATELET # BLD AUTO: 247 K/UL — SIGNIFICANT CHANGE UP (ref 150–400)
POTASSIUM SERPL-MCNC: 3.8 MMOL/L — SIGNIFICANT CHANGE UP (ref 3.5–5.3)
POTASSIUM SERPL-SCNC: 3.8 MMOL/L — SIGNIFICANT CHANGE UP (ref 3.5–5.3)
PROT SERPL-MCNC: 7.3 G/DL — SIGNIFICANT CHANGE UP (ref 6–8.3)
PROT UR-MCNC: SIGNIFICANT CHANGE UP
RBC # BLD: 4.68 M/UL — SIGNIFICANT CHANGE UP (ref 3.8–5.2)
RBC # FLD: 12.3 % — SIGNIFICANT CHANGE UP (ref 10.3–14.5)
RBC CASTS # UR COMP ASSIST: 5 /HPF — HIGH (ref 0–4)
SODIUM SERPL-SCNC: 141 MMOL/L — SIGNIFICANT CHANGE UP (ref 135–145)
SP GR SPEC: 1.03 — HIGH (ref 1.01–1.02)
UROBILINOGEN FLD QL: NEGATIVE — SIGNIFICANT CHANGE UP
WBC # BLD: 9.94 K/UL — SIGNIFICANT CHANGE UP (ref 3.8–10.5)
WBC # FLD AUTO: 9.94 K/UL — SIGNIFICANT CHANGE UP (ref 3.8–10.5)
WBC UR QL: 2 /HPF — SIGNIFICANT CHANGE UP (ref 0–5)

## 2020-02-10 PROCEDURE — 99284 EMERGENCY DEPT VISIT MOD MDM: CPT | Mod: 25

## 2020-02-10 PROCEDURE — 87491 CHLMYD TRACH DNA AMP PROBE: CPT

## 2020-02-10 PROCEDURE — 85027 COMPLETE CBC AUTOMATED: CPT

## 2020-02-10 PROCEDURE — 87591 N.GONORRHOEAE DNA AMP PROB: CPT

## 2020-02-10 PROCEDURE — 76830 TRANSVAGINAL US NON-OB: CPT | Mod: 26

## 2020-02-10 PROCEDURE — 80053 COMPREHEN METABOLIC PANEL: CPT

## 2020-02-10 PROCEDURE — 87086 URINE CULTURE/COLONY COUNT: CPT

## 2020-02-10 PROCEDURE — 93975 VASCULAR STUDY: CPT

## 2020-02-10 PROCEDURE — 76830 TRANSVAGINAL US NON-OB: CPT

## 2020-02-10 PROCEDURE — 81001 URINALYSIS AUTO W/SCOPE: CPT

## 2020-02-10 PROCEDURE — 99285 EMERGENCY DEPT VISIT HI MDM: CPT

## 2020-02-10 PROCEDURE — 99053 MED SERV 10PM-8AM 24 HR FAC: CPT

## 2020-02-10 PROCEDURE — 81025 URINE PREGNANCY TEST: CPT

## 2020-02-10 PROCEDURE — 86703 HIV-1/HIV-2 1 RESULT ANTBDY: CPT

## 2020-02-10 PROCEDURE — 93975 VASCULAR STUDY: CPT | Mod: 26

## 2020-02-10 RX ORDER — ACETAMINOPHEN 500 MG
650 TABLET ORAL ONCE
Refills: 0 | Status: COMPLETED | OUTPATIENT
Start: 2020-02-10 | End: 2020-02-10

## 2020-02-10 RX ORDER — IBUPROFEN 200 MG
600 TABLET ORAL ONCE
Refills: 0 | Status: COMPLETED | OUTPATIENT
Start: 2020-02-10 | End: 2020-02-10

## 2020-02-10 RX ORDER — SODIUM CHLORIDE 9 MG/ML
1000 INJECTION INTRAMUSCULAR; INTRAVENOUS; SUBCUTANEOUS ONCE
Refills: 0 | Status: COMPLETED | OUTPATIENT
Start: 2020-02-10 | End: 2020-02-10

## 2020-02-10 RX ADMIN — Medication 600 MILLIGRAM(S): at 03:25

## 2020-02-10 RX ADMIN — SODIUM CHLORIDE 2000 MILLILITER(S): 9 INJECTION INTRAMUSCULAR; INTRAVENOUS; SUBCUTANEOUS at 03:25

## 2020-02-10 RX ADMIN — Medication 650 MILLIGRAM(S): at 04:25

## 2020-02-10 RX ADMIN — Medication 600 MILLIGRAM(S): at 04:25

## 2020-02-10 RX ADMIN — Medication 650 MILLIGRAM(S): at 03:25

## 2020-02-10 NOTE — ED PROVIDER NOTE - ATTENDING CONTRIBUTION TO CARE
23yo female pmh chlamydia treated with oral meds 5y ago p/w pelvic pain x 1 year, has not seen gyn in 3 years, has not felt her mirena IUD string in 2 years. Sexually active with 1 male partner only. C/o dyspareunia. Denies fevers, chills, n/v/d, vaginal discharge, dysuria, vaginal discharge. TTP suprapubic. Labs with chlamydia, gonorrhea. TVUS r/o TOA. Unlikely to be appendicitis as pain has been present for 1 year. Tylenol for pain

## 2020-02-10 NOTE — ED PROVIDER NOTE - PROGRESS NOTE DETAILS
Resident: Huy Lewis – Pt was re-evaluated at bedside, VSS, feeling better overall. Results were discussed with patient as well as return precautions and follow up plan with PCP and/or specialist. Time was taken to answer any questions that the patient had before providing them with discharge paperwork.

## 2020-02-10 NOTE — ED PROVIDER NOTE - CLINICAL SUMMARY MEDICAL DECISION MAKING FREE TEXT BOX
24 Y F with hx of IUD misplaced x 1 year with lower abdominal TTP no real vaginal symptoms on hx or exam, will get TVUS concern for possible ovarian pathology vs dislodged IUD. Will get basic labs, pain medications, fluids.

## 2020-02-10 NOTE — ED ADULT NURSE NOTE - OBJECTIVE STATEMENT
25 y/o female denies PMH presents to ED reporting pelvic pain. Pt reports having pelvic pain for approximately 9 months. Pt also reports having been unable to feel IUD strings for over one year. But today pt reports increase of pain since 10PM yesterday, decreased PO intake since Friday and nausea since Friday. Pt reports not having gone to gynecologist in over 3 years and occasional "spotting" & reports not having period in the past two years. On exam, AOx3, speaking in complete sentences. Lung sounds CTA, NAD. Abdomen soft, non-tender, non-distended, normoactive bowel sounds. Pt denies bleeding, discharge, CP, SOB, v/d, fever/chills at this time. Awaiting evaluation by MD at this time.

## 2020-02-10 NOTE — ED PROVIDER NOTE - NSFOLLOWUPINSTRUCTIONS_ED_ALL_ED_FT
You were seen today in the emergency room for abdominal pain. Although the testing done today indicates that your pain is not from an acute emergency, your pain could still represent a more serious problem. Most commonly, the pain you are having is likely not something serious and will resolve in a few days, however testing was done today based on the symptoms that you currently have; so if you develop new or worsening symptoms this could be a sign of a problem that was not tested for and means you should come back to the emergency room or see your doctor urgently. You need to follow up with your doctor in the next 48-72 hours. If you develop any new or worsening symptoms you need to return immediately to the emergency department. If you experience any of the following please come right back to the emergency room: severe nausea and vomiting with inability to tolerate eating, severe worsening of your pain, a new fever, new bleeding in stool or vomit, confusion, new numbness or weakness, passing out.     You need to follow up with your OBGYN as soon as possible.    To control your pain at home, you should take Ibuprofen 400 mg along with Tylenol 650mg-1000mg every 6 to 8 hours. Limit your maximum daily Tylenol from all sources to 4000mg. Be aware that many other medications contain acetaminophen which is also known as Tylenol. Taking Tylenol and Ibuprofen together has been shown to be more effective at relieving pain than taking them separately. These are both over the counter medications that you can  at your local pharmacy without a prescription. You need to respect all of the warnings on the bottles. You shouldn’t take these medications for more than a week without following up with your doctor. Both medications come with certain risks and side effects that you need to discuss with your doctor, especially if you are taking them for a prolonged period.

## 2020-02-10 NOTE — ED POST DISCHARGE NOTE - ADDITIONAL DOCUMENTATION
2/10: Pt called, asking for STD test results. informed that rapid HIV was negative, GC/CT results not back yet. Pt informed she will be called with abnl results. -Randolph Peres PA-C

## 2020-02-10 NOTE — ED PROVIDER NOTE - OBJECTIVE STATEMENT
24 Y F says that she has been having pain in her cervix x over a year, lost the string to IUD about a year ago as well. She says that the pain has been getting worse, now especially painful with sex, she notes that her last OB appointment was 3 years ago. Denies fever. Admits to nausea and vomiting. She had a hx of Chlamydia 5 years ago. Denies any vaginal discharge. She is still sexually active with 1 male partner not using protection. Her LMP was 2 years ago.

## 2020-02-10 NOTE — ED PROVIDER NOTE - PATIENT PORTAL LINK FT
You can access the FollowMyHealth Patient Portal offered by [facility] by registering at the following website: [email or web address]. By joining [facility]’s FollowMyHealth portal, you will also be able to view your health information using other applications (apps) compatible with our system.

## 2020-02-11 LAB
C TRACH RRNA SPEC QL NAA+PROBE: SIGNIFICANT CHANGE UP
CULTURE RESULTS: SIGNIFICANT CHANGE UP
N GONORRHOEA RRNA SPEC QL NAA+PROBE: SIGNIFICANT CHANGE UP
SPECIMEN SOURCE: SIGNIFICANT CHANGE UP
SPECIMEN SOURCE: SIGNIFICANT CHANGE UP

## 2020-03-25 ENCOUNTER — TRANSCRIPTION ENCOUNTER (OUTPATIENT)
Age: 25
End: 2020-03-25

## 2020-04-01 ENCOUNTER — APPOINTMENT (OUTPATIENT)
Dept: OBGYN | Facility: CLINIC | Age: 25
End: 2020-04-01
Payer: MEDICAID

## 2020-04-01 ENCOUNTER — RESULT CHARGE (OUTPATIENT)
Age: 25
End: 2020-04-01

## 2020-04-01 ENCOUNTER — OUTPATIENT (OUTPATIENT)
Dept: OUTPATIENT SERVICES | Facility: HOSPITAL | Age: 25
LOS: 1 days | End: 2020-04-01
Payer: MEDICAID

## 2020-04-01 VITALS — SYSTOLIC BLOOD PRESSURE: 104 MMHG | WEIGHT: 184 LBS | DIASTOLIC BLOOD PRESSURE: 80 MMHG

## 2020-04-01 DIAGNOSIS — N76.0 ACUTE VAGINITIS: ICD-10-CM

## 2020-04-01 LAB
HCG UR QL: NEGATIVE
QUALITY CONTROL: YES

## 2020-04-01 PROCEDURE — 58301 REMOVE INTRAUTERINE DEVICE: CPT | Mod: NC

## 2020-04-01 PROCEDURE — 58301 REMOVE INTRAUTERINE DEVICE: CPT

## 2020-04-01 PROCEDURE — 81025 URINE PREGNANCY TEST: CPT

## 2020-04-01 PROCEDURE — G0463: CPT | Mod: 25

## 2020-04-01 PROCEDURE — 99213 OFFICE O/P EST LOW 20 MIN: CPT | Mod: NC,25

## 2020-04-01 NOTE — PROCEDURE
[IUD Removal] : IUD [Mirena] : Mirena [Patient] : patient [Risks] : risks [Benefits] : benefits [Alternatives] : alternatives [Consent Obtained] : consent was obtained prior to the procedure and is detailed in the patient's record [Nonvisualization Of Strings] : the IUD strings were not visible [IUD Discarded] : discarded [Tolerated Well] : the patient tolerated the procedure well [No Complications] : none [de-identified] : intermittent pain [de-identified] : Narrow nose needle  grasped strings in upper OS under sono guidance.  IUD removed with ease- intact.

## 2020-04-02 ENCOUNTER — APPOINTMENT (OUTPATIENT)
Dept: OBGYN | Facility: CLINIC | Age: 25
End: 2020-04-02

## 2020-04-07 DIAGNOSIS — Z30.432 ENCOUNTER FOR REMOVAL OF INTRAUTERINE CONTRACEPTIVE DEVICE: ICD-10-CM

## 2020-04-07 DIAGNOSIS — Z30.9 ENCOUNTER FOR CONTRACEPTIVE MANAGEMENT, UNSPECIFIED: ICD-10-CM

## 2020-06-05 ENCOUNTER — TRANSCRIPTION ENCOUNTER (OUTPATIENT)
Age: 25
End: 2020-06-05

## 2020-06-15 ENCOUNTER — TRANSCRIPTION ENCOUNTER (OUTPATIENT)
Age: 25
End: 2020-06-15

## 2020-06-22 ENCOUNTER — LABORATORY RESULT (OUTPATIENT)
Age: 25
End: 2020-06-22

## 2020-06-22 ENCOUNTER — OUTPATIENT (OUTPATIENT)
Dept: OUTPATIENT SERVICES | Facility: HOSPITAL | Age: 25
LOS: 1 days | End: 2020-06-22
Payer: MEDICAID

## 2020-06-22 ENCOUNTER — TRANSCRIPTION ENCOUNTER (OUTPATIENT)
Age: 25
End: 2020-06-22

## 2020-06-22 ENCOUNTER — APPOINTMENT (OUTPATIENT)
Dept: OBGYN | Facility: CLINIC | Age: 25
End: 2020-06-22
Payer: MEDICAID

## 2020-06-22 VITALS — DIASTOLIC BLOOD PRESSURE: 76 MMHG | WEIGHT: 181 LBS | SYSTOLIC BLOOD PRESSURE: 120 MMHG

## 2020-06-22 DIAGNOSIS — Z11.3 ENCOUNTER FOR SCREENING FOR INFECTIONS WITH A PREDOMINANTLY SEXUAL MODE OF TRANSMISSION: ICD-10-CM

## 2020-06-22 DIAGNOSIS — Z33.2 ENCOUNTER FOR ELECTIVE TERMINATION OF PREGNANCY: ICD-10-CM

## 2020-06-22 DIAGNOSIS — N76.0 ACUTE VAGINITIS: ICD-10-CM

## 2020-06-22 DIAGNOSIS — Z30.430 ENCOUNTER FOR INSERTION OF INTRAUTERINE CONTRACEPTIVE DEVICE: ICD-10-CM

## 2020-06-22 DIAGNOSIS — Z3A.01 LESS THAN 8 WEEKS GESTATION OF PREGNANCY: ICD-10-CM

## 2020-06-22 DIAGNOSIS — A74.9 CHLAMYDIAL INFECTION, UNSPECIFIED: ICD-10-CM

## 2020-06-22 DIAGNOSIS — Z32.00 ENCOUNTER FOR PREGNANCY TEST, RESULT UNKNOWN: ICD-10-CM

## 2020-06-22 DIAGNOSIS — Z30.9 ENCOUNTER FOR CONTRACEPTIVE MANAGEMENT, UNSPECIFIED: ICD-10-CM

## 2020-06-22 DIAGNOSIS — Z30.432 ENCOUNTER FOR REMOVAL OF INTRAUTERINE CONTRACEPTIVE DEVICE: ICD-10-CM

## 2020-06-22 PROCEDURE — G0463: CPT

## 2020-06-22 PROCEDURE — 87389 HIV-1 AG W/HIV-1&-2 AB AG IA: CPT

## 2020-06-22 PROCEDURE — 86780 TREPONEMA PALLIDUM: CPT

## 2020-06-22 PROCEDURE — 99395 PREV VISIT EST AGE 18-39: CPT | Mod: NC

## 2020-06-22 PROCEDURE — 87591 N.GONORRHOEAE DNA AMP PROB: CPT

## 2020-06-22 PROCEDURE — 87340 HEPATITIS B SURFACE AG IA: CPT

## 2020-06-22 PROCEDURE — 84146 ASSAY OF PROLACTIN: CPT

## 2020-06-22 PROCEDURE — 87491 CHLMYD TRACH DNA AMP PROBE: CPT

## 2020-06-23 LAB
C TRACH RRNA SPEC QL NAA+PROBE: SIGNIFICANT CHANGE UP
HBV SURFACE AG SER-ACNC: SIGNIFICANT CHANGE UP
HIV 1+2 AB+HIV1 P24 AG SERPL QL IA: SIGNIFICANT CHANGE UP
N GONORRHOEA RRNA SPEC QL NAA+PROBE: SIGNIFICANT CHANGE UP
PROLACTIN SERPL-MCNC: 10.5 NG/ML — SIGNIFICANT CHANGE UP (ref 3.4–24.1)
SPECIMEN SOURCE: SIGNIFICANT CHANGE UP
T PALLIDUM AB TITR SER: NEGATIVE — SIGNIFICANT CHANGE UP

## 2020-06-25 DIAGNOSIS — N64.52 NIPPLE DISCHARGE: ICD-10-CM

## 2020-06-25 DIAGNOSIS — N64.3 GALACTORRHEA NOT ASSOCIATED WITH CHILDBIRTH: ICD-10-CM

## 2020-06-25 LAB — CYTOLOGY SPEC DOC CYTO: SIGNIFICANT CHANGE UP

## 2020-06-25 NOTE — HISTORY OF PRESENT ILLNESS
[___ Year(s) Ago] : [unfilled] year(s) ago [Left Breast] : left [Right Breast] : right [Nipple Discharge] : nipple discharge [___ Months] : started [unfilled] months ago [Regular Cycle Intervals] : periods have been regular [Definite:  ___ (Date)] : the last menstrual period was [unfilled] [Monogamous] : is monogamous [Sexually Active] : is sexually active [Contraception] : uses contraception [Oral Contraceptives] : uses oral contraceptives [Normal Amount/Duration] : was abnormal [Menstrual Cramps] : no menstrual cramps

## 2020-06-25 NOTE — CHIEF COMPLAINT
[Annual Visit] : annual visit [FreeTextEntry1] : i have nipple discharge and I have just started OCPs in April of this year

## 2020-06-25 NOTE — PHYSICAL EXAM
[Awake] : awake [Alert] : alert [Nipple Discharge] : nipple discharge [Oriented x3] : oriented to person, place, and time [Soft] : soft [Normal] : uterus [No Bleeding] : there was no active vaginal bleeding [Uterine Adnexae] : were not tender and not enlarged [Acute Distress] : no acute distress [Tender] : non tender

## 2020-07-02 ENCOUNTER — APPOINTMENT (OUTPATIENT)
Dept: ENDOCRINOLOGY | Facility: CLINIC | Age: 25
End: 2020-07-02
Payer: MEDICAID

## 2020-07-02 VITALS
BODY MASS INDEX: 34.75 KG/M2 | DIASTOLIC BLOOD PRESSURE: 60 MMHG | TEMPERATURE: 97.7 F | HEIGHT: 60 IN | SYSTOLIC BLOOD PRESSURE: 112 MMHG | WEIGHT: 177 LBS | OXYGEN SATURATION: 98 % | HEART RATE: 74 BPM

## 2020-07-02 DIAGNOSIS — E66.3 OVERWEIGHT: ICD-10-CM

## 2020-07-02 PROCEDURE — 99204 OFFICE O/P NEW MOD 45 MIN: CPT

## 2020-07-07 ENCOUNTER — LABORATORY RESULT (OUTPATIENT)
Age: 25
End: 2020-07-07

## 2020-07-07 NOTE — PHYSICAL EXAM
[Alert] : alert [Obese] : obese [No Acute Distress] : no acute distress [EOMI] : extra ocular movement intact [No Respiratory Distress] : no respiratory distress [No Accessory Muscle Use] : no accessory muscle use [Clear to Auscultation] : lungs were clear to auscultation bilaterally [Normal S1, S2] : normal S1 and S2 [Normal Rate] : heart rate was normal [Regular Rhythm] : with a regular rhythm [No Galactorrhea] : no galactorrhea [Normal Gait] : normal gait [No Nipple Discharge] : no nipple discharge [No Tremors] : no tremors [Normal Strength/Tone] : muscle strength and tone were normal [No Motor Deficits] : the motor exam was normal [Oriented x3] : oriented to person, place, and time [Normal Affect] : the affect was normal [Normal Mood] : the mood was normal [Normal Insight/Judgement] : insight and judgment were intact [de-identified] : pt was not able to express any discharge or milky discharge

## 2020-07-07 NOTE — HISTORY OF PRESENT ILLNESS
[FreeTextEntry1] : 25 years old \par hx of high proalctin \par hx of irregualar menses \par \par \par here for increased breast pain and nipple dc of recent onset \par \par \par pt had seen Dr. Judd in  - she was referred for elevated prolactin, mildly elevtaed \par pts reepat prolactin was normal when she wa son ocp \par further hormonal eval was normal as well \par she was to get MRI - and no MRI in the system \par \par per the pt:\par hx of high proalctin \par was on carbeglonine for a short time in ?\par recently has had breast heaviness and painful\par +galactorheaa \par no vision changes \par +headaches \par +right sided headaches- goes away with tylenleol \par +nasuea/dizziness with HA \par \par \par her recent labs had a normal Prolactin/\par 10.5 2020\par 12.8 2016\par 2015 (elevated)\par 16 (oct 2014)\par \par \par \par \par Meds: \par she is on birth control \par \par \par PSH \par  in 2017\par \par FHX \par none of hormone disorders\par \par \par SH\par works with customer serives \par no illcitis \par sexually active \par

## 2020-07-07 NOTE — ASSESSMENT
[FreeTextEntry1] : 25 years old \par hx of high proalctin \par hx of irregualar menses \par \par \par here for increased breast pain and nipple dc of recent onset \par \par it is unclear what is leading to the pts breast heaviness and increased breast pain\par she expresses breast discharge which was not present on todays exam\par her recent prolactin was normal even on OCP\par \par we can get a AM pit panel to assess as she was to get MRI in past and this was not done \par we can repeat the prolactin with a repeat macroprolactin\par \par \par she needs to followup with GYN with breast US and imaging bc at this time, her prolactin is nromal and it us unclear what is leading to her breast heaviness but it is not due to a elevated prolactin as seen by her recent normal labs\par  \par \par pt expressed understanding \par \par pt also should followup with PCP regarding ongoing medical care and followup of medical issues/concerns and exam\par followup with GYN for further workup as well \par \par

## 2020-07-28 DIAGNOSIS — N64.52 NIPPLE DISCHARGE: ICD-10-CM

## 2020-07-28 DIAGNOSIS — N64.4 MASTODYNIA: ICD-10-CM

## 2020-07-28 LAB
ACTH SER-ACNC: 40.5 PG/ML
CORTIS SERPL-MCNC: 13.1 UG/DL
ESTIMATED AVERAGE GLUCOSE: 97 MG/DL
ESTRADIOL SERPL-MCNC: 227 PG/ML
FSH SERPL-MCNC: 7.5 IU/L
HBA1C MFR BLD HPLC: 5 %
HCG SERPL-MCNC: <1 MIU/ML
LH SERPL-ACNC: 28.8 IU/L
MONOMERIC PROLACTIN (ICMA)*: 29 NG/ML
PERCENT MACROPROLACTIN: 24 %
PROLACTIN, SERUM (ICMA)*: 38 NG/ML
TSH SERPL-ACNC: 4.62 UIU/ML

## 2020-07-29 ENCOUNTER — LABORATORY RESULT (OUTPATIENT)
Age: 25
End: 2020-07-29

## 2020-07-29 LAB
THYROGLOB AB SERPL-ACNC: <20 IU/ML
THYROPEROXIDASE AB SERPL IA-ACNC: 13.5 IU/ML

## 2020-07-31 LAB
PROLACTIN SERPL-MCNC: 99 NG/ML
TSH SERPL-ACNC: 4.47 UIU/ML

## 2020-08-03 ENCOUNTER — RESULT REVIEW (OUTPATIENT)
Age: 25
End: 2020-08-03

## 2020-08-03 ENCOUNTER — OUTPATIENT (OUTPATIENT)
Dept: OUTPATIENT SERVICES | Facility: HOSPITAL | Age: 25
LOS: 1 days | End: 2020-08-03
Payer: MEDICAID

## 2020-08-03 ENCOUNTER — APPOINTMENT (OUTPATIENT)
Dept: ULTRASOUND IMAGING | Facility: IMAGING CENTER | Age: 25
End: 2020-08-03
Payer: MEDICAID

## 2020-08-03 DIAGNOSIS — N64.4 MASTODYNIA: ICD-10-CM

## 2020-08-03 PROCEDURE — 76641 ULTRASOUND BREAST COMPLETE: CPT

## 2020-08-03 PROCEDURE — 76641 ULTRASOUND BREAST COMPLETE: CPT | Mod: 26,50

## 2020-08-06 LAB
ACTH SER-ACNC: 29 PG/ML
CORTIS SERPL-MCNC: 9.6 UG/DL
ESTRADIOL SERPL-MCNC: 43 PG/ML
FSH SERPL-MCNC: 6.1 IU/L
IGF-1 INTERP: NORMAL
IGF-I BLD-MCNC: 197 NG/ML
LH SERPL-ACNC: 10.8 IU/L
T4 FREE SERPL-MCNC: 1.2 NG/DL
TSH SERPL-ACNC: 2.57 UIU/ML

## 2020-08-10 ENCOUNTER — OUTPATIENT (OUTPATIENT)
Dept: OUTPATIENT SERVICES | Facility: HOSPITAL | Age: 25
LOS: 1 days | End: 2020-08-10
Payer: MEDICAID

## 2020-08-10 ENCOUNTER — APPOINTMENT (OUTPATIENT)
Dept: OBGYN | Facility: CLINIC | Age: 25
End: 2020-08-10
Payer: MEDICAID

## 2020-08-10 VITALS — DIASTOLIC BLOOD PRESSURE: 80 MMHG | SYSTOLIC BLOOD PRESSURE: 122 MMHG | WEIGHT: 185.38 LBS

## 2020-08-10 PROCEDURE — 99213 OFFICE O/P EST LOW 20 MIN: CPT | Mod: GE

## 2020-08-10 PROCEDURE — G0463: CPT

## 2020-08-10 RX ORDER — ULIPRISTAL ACETATE 30 MG/1
1 TABLET ORAL
Qty: 1 | Refills: 0
Start: 2020-08-10

## 2020-08-10 RX ORDER — LEVONORGESTREL 1.5 MG/1
1.5 TABLET ORAL ONCE
Qty: 1 | Refills: 0 | Status: COMPLETED | COMMUNITY
Start: 2020-08-10

## 2020-08-10 NOTE — CHIEF COMPLAINT
[FreeTextEntry1] : Pt is here for followup after visiting endocrinologist for high prolactin and galactorrhea. [Follow Up] : follow up GYN visit

## 2020-08-10 NOTE — CHIEF COMPLAINT
[Follow Up] : follow up GYN visit [FreeTextEntry1] : Pt is here for followup after visiting endocrinologist for high prolactin and galactorrhea.

## 2020-08-10 NOTE — HISTORY OF PRESENT ILLNESS
[1 Year Ago] : 1 year ago [Good] : being in good health [Weight Concerns] : weight concens [Reproductive Age] : is of reproductive age [Contraception] : does not use contraception [Diffused Pain] : diffused breast pain [Nipple Discharge] : nipple discharge [Right Breast] : right [Left Breast] : left [___ Months] : started [unfilled] months ago [Approximately ___ (Month)] : the LMP was approximately [unfilled] month(s) ago [Frequency: Q ___ days] : menstrual periods occur approximately every [unfilled] days [Monogamous] : is monogamous [Sexually Active] : is sexually active [Male ___] : [unfilled] male

## 2020-08-10 NOTE — HISTORY OF PRESENT ILLNESS
[1 Year Ago] : 1 year ago [Good] : being in good health [Weight Concerns] : weight concens [Reproductive Age] : is of reproductive age [Contraception] : does not use contraception [Nipple Discharge] : nipple discharge [Diffused Pain] : diffused breast pain [Right Breast] : right [___ Months] : started [unfilled] months ago [Left Breast] : left [Frequency: Q ___ days] : menstrual periods occur approximately every [unfilled] days [Approximately ___ (Month)] : the LMP was approximately [unfilled] month(s) ago [Monogamous] : is monogamous [Sexually Active] : is sexually active [Male ___] : [unfilled] male

## 2020-08-11 ENCOUNTER — APPOINTMENT (OUTPATIENT)
Dept: MRI IMAGING | Facility: CLINIC | Age: 25
End: 2020-08-11

## 2020-08-11 ENCOUNTER — OUTPATIENT (OUTPATIENT)
Dept: OUTPATIENT SERVICES | Facility: HOSPITAL | Age: 25
LOS: 1 days | End: 2020-08-11
Payer: MEDICAID

## 2020-08-11 DIAGNOSIS — Z00.8 ENCOUNTER FOR OTHER GENERAL EXAMINATION: ICD-10-CM

## 2020-08-11 DIAGNOSIS — R79.89 OTHER SPECIFIED ABNORMAL FINDINGS OF BLOOD CHEMISTRY: ICD-10-CM

## 2020-08-11 PROCEDURE — 70553 MRI BRAIN STEM W/O & W/DYE: CPT | Mod: 26

## 2020-08-11 PROCEDURE — 70553 MRI BRAIN STEM W/O & W/DYE: CPT

## 2020-08-11 PROCEDURE — A9585: CPT

## 2020-08-15 NOTE — REVIEW OF SYSTEMS
[Nl] : Musculoskeletal [Chills] : no chills [Dec Hearing] : no hearing loss [Chest Pain] : no chest pain [Dyspnea] : no shortness of breath [Abdominal Pain] : no abdominal pain [Vomiting] : no vomiting [Pelvic Pain] : no pelvic pain [Arthralgias] : no arthralgias [Frequency] : no frequency [Anxiety] : no anxiety [Headache] : no headache [Breast Pain] : no breast pain

## 2020-08-15 NOTE — REVIEW OF SYSTEMS
[Nl] : Musculoskeletal [Chills] : no chills [Dec Hearing] : no hearing loss [Chest Pain] : no chest pain [Dyspnea] : no shortness of breath [Pelvic Pain] : no pelvic pain [Abdominal Pain] : no abdominal pain [Vomiting] : no vomiting [Arthralgias] : no arthralgias [Frequency] : no frequency [Anxiety] : no anxiety [Breast Pain] : no breast pain [Headache] : no headache

## 2020-08-18 ENCOUNTER — APPOINTMENT (OUTPATIENT)
Dept: ENDOCRINOLOGY | Facility: CLINIC | Age: 25
End: 2020-08-18
Payer: MEDICAID

## 2020-08-18 VITALS
TEMPERATURE: 97.9 F | HEIGHT: 60 IN | DIASTOLIC BLOOD PRESSURE: 86 MMHG | HEART RATE: 66 BPM | WEIGHT: 181 LBS | OXYGEN SATURATION: 99 % | BODY MASS INDEX: 35.53 KG/M2 | SYSTOLIC BLOOD PRESSURE: 116 MMHG

## 2020-08-18 DIAGNOSIS — N64.3 GALACTORRHEA NOT ASSOCIATED WITH CHILDBIRTH: ICD-10-CM

## 2020-08-18 DIAGNOSIS — R79.89 OTHER SPECIFIED ABNORMAL FINDINGS OF BLOOD CHEMISTRY: ICD-10-CM

## 2020-08-18 PROCEDURE — 99215 OFFICE O/P EST HI 40 MIN: CPT

## 2020-08-18 NOTE — ASSESSMENT
[FreeTextEntry1] : 25 years old \par hx of high proalctin \par hx of irregualar menses \par \par \par who came to see me in July 2020 for breast heaviness and galactrorrhea \par workup showing elevated proalctin to 99 from normal just weeks prior \par MRI with microadenoma \par \par at length discussion with tht pt regarding microadenoma, workup and plan\par her pit workup otherwise is unremarble for hyperfunction and will rule out cushings with sliavary cortisol X2 \par we will start carbegonline for now 0.25mcg twice a week with labs in 4 weeks\par \par pt does not have immediate plans for pregnancy and is sexually active\par d/w pt to use protection as with carebgoline periods will become regulated \par \par d/w pt in future once periods regulated and plans for pregnancy she needs to notify me of first missed period and we will stop the medication \par \par regarding her eye pain: it is localzied in the left eye, visual fields grossly intact per MRI her optic chiasm is okay \par she can see optho for further eval but do not think this is due to the pit microadenoma \par \par \par \par she needs to followup with GYN and PCP for ognoing medical care \par \par \par pt expressed understanding \par \par \par  [FreeTextEntry3] : carbedgline and increased risk for sexxuality, gambling etc black box warning discussed with pt

## 2020-08-18 NOTE — PHYSICAL EXAM
[Alert] : alert [Obese] : obese [No Acute Distress] : no acute distress [EOMI] : extra ocular movement intact [Visual Fields Grossly Intact] : visual fields grossly intact [No Respiratory Distress] : no respiratory distress [No Accessory Muscle Use] : no accessory muscle use [Clear to Auscultation] : lungs were clear to auscultation bilaterally [Normal S1, S2] : normal S1 and S2 [Normal Rate] : heart rate was normal [Regular Rhythm] : with a regular rhythm [No Galactorrhea] : no galactorrhea [Normal Gait] : normal gait [Normal Strength/Tone] : muscle strength and tone were normal [No Motor Deficits] : the motor exam was normal [No Tremors] : no tremors [Oriented x3] : oriented to person, place, and time [Normal Affect] : the affect was normal [Normal Insight/Judgement] : insight and judgment were intact [Normal Mood] : the mood was normal [de-identified] : she has some darkening around the right eyelid and below

## 2020-08-18 NOTE — HISTORY OF PRESENT ILLNESS
[FreeTextEntry1] : 25 years old \par hx of high proalctin \par hx of irregualar menses \par \par she initally came to see me in 2020 \par here for increased breast pain and nipple dc of recent onset \par pt had seen Dr. Judd in  - she was referred for elevated prolactin, mildly elevtaed \par pts reepat prolactin was normal when she was on ocp \par further hormonal eval was normal as well \par she was to get MRI - and no MRI in the system at that time \par per the pt:\par hx of high proalctin \par was on carbeglonine for a short time in ?\par recently has had breast heaviness and painful\par \par at her july visit in , \par +galactorheaa \par no vision changes \par +headaches \par +right sided headaches- goes away with tylenleol \par +nasuea/dizziness with HA \par \par \par her recent labs had a normal Prolactin/\par 10.5 2020\par 12.8 2016\par 2015 (elevated)\par 16 (oct 2014)\par \par \par her workup thereafter showed \par elevated TSH and than it normalzied with normal Free T4\par and a prolactin of 38 (now high), monomeric 29 and and than 99 \par I did an MRI based on this :\par \par \par \par \par INTERPRETATION:  Clinical indication: Elevated prolactin levels.\par A normal posterior pituitary bright spot is seen.\par The pituitary gland measures approximately 7.3 mm in maximum height which is within normal limits\par Small tiny focus of decreased enhancement involving the left pituitary gland is identified. This finding is best seen on series 9 image 23 and measures approximately 3.4 x 1.9 mm. 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 she still reports, intermittent periods and galactroehea \par she also states she has left eye pain but no vision changes \par she is sexually active and has future plans for pregnancy (none at current)\par \par she is not on medications or has no medication allergies \par \par \par Labs:\par \par 2020 \par cortisol 9.6 acth 29\par estradiol 43\par free t4 1.2\par FSH 6.1 LH 10.8\par TSH 2.57<-----4.47 (2020)<----4.62\par IGF 1 197\par \par 2020 \par cortisol 13.1 acth 40.5\par estradiol 227\par free t4 1.1 \par fsh 7.5 28.8\par \par \par labs \par a1c5.0\par hcg <1\par \par \par PSH \par  in 2017\par \par FHX \par none of hormone disorders\par \par \par SH\par works with customer serives \par no illcitis \par sexually active \par

## 2020-08-24 LAB
HCG SERPL-MCNC: <1 MIU/ML
PROLACTIN SERPL-MCNC: 32 NG/ML

## 2020-08-25 DIAGNOSIS — Z30.9 ENCOUNTER FOR CONTRACEPTIVE MANAGEMENT, UNSPECIFIED: ICD-10-CM

## 2020-08-25 DIAGNOSIS — E22.1 HYPERPROLACTINEMIA: ICD-10-CM

## 2020-08-28 LAB
CORTIS SAL-MCNC: NORMAL
CORTIS SAL-MCNC: NORMAL

## 2020-09-08 ENCOUNTER — APPOINTMENT (OUTPATIENT)
Dept: ENDOCRINOLOGY | Facility: CLINIC | Age: 25
End: 2020-09-08

## 2020-09-15 ENCOUNTER — OUTPATIENT (OUTPATIENT)
Dept: OUTPATIENT SERVICES | Facility: HOSPITAL | Age: 25
LOS: 1 days | End: 2020-09-15
Payer: MEDICAID

## 2020-09-15 ENCOUNTER — APPOINTMENT (OUTPATIENT)
Dept: OBGYN | Facility: CLINIC | Age: 25
End: 2020-09-15
Payer: MEDICAID

## 2020-09-15 ENCOUNTER — LABORATORY RESULT (OUTPATIENT)
Age: 25
End: 2020-09-15

## 2020-09-15 VITALS — WEIGHT: 190 LBS | DIASTOLIC BLOOD PRESSURE: 76 MMHG | SYSTOLIC BLOOD PRESSURE: 120 MMHG

## 2020-09-15 DIAGNOSIS — Z32.00 ENCOUNTER FOR PREGNANCY TEST, RESULT UNKNOWN: ICD-10-CM

## 2020-09-15 DIAGNOSIS — N76.0 ACUTE VAGINITIS: ICD-10-CM

## 2020-09-15 DIAGNOSIS — R79.89 OTHER SPECIFIED ABNORMAL FINDINGS OF BLOOD CHEMISTRY: ICD-10-CM

## 2020-09-15 PROCEDURE — 81025 URINE PREGNANCY TEST: CPT

## 2020-09-15 PROCEDURE — 99213 OFFICE O/P EST LOW 20 MIN: CPT | Mod: NC,TH

## 2020-09-15 PROCEDURE — 84443 ASSAY THYROID STIM HORMONE: CPT

## 2020-09-15 PROCEDURE — G0463: CPT

## 2020-09-15 NOTE — HISTORY OF PRESENT ILLNESS
[FreeTextEntry1] : 24yo  (LMP 2020) presents for confirmation of viable pregnancy.  Pt with h/o prolactinoma (micro)-  followed by endocrine.  Pt never took cabergoline as prescribed because she discovered she was pregnant.  Unplanned/ desired pregnancy.  Denies VB/cramping.  Denies HA/ visual changes. \par \par -pap 2020 NEG

## 2020-09-15 NOTE — PROCEDURE
[Transvaginal OB Sonogram] : Transvaginal OB Sonogram [Intrauterine Pregnancy] : intrauterine pregnancy [Yolk Sac] : yolk sac present [Fetal Heart] : fetal heart present [CRL: ___ (mm)] : CRL - [unfilled]Umm [Date: ___] : Date: [unfilled] [Current GA by Sonogram: ___ (wks)] : Current GA by Sonogram: [unfilled]Uwks [___ day(s)] : [unfilled] days

## 2020-09-15 NOTE — DISCUSSION/SUMMARY
[FreeTextEntry1] : 26yo  - @7.4wks by LMP-\par - viable pregnancy confirmed by limited sono (7wk1d CRL)\par - h/o prolactinoma (not taking any meds)- followed by endocrine, next appt 2020\par -  [ ]TSH drawn  (prior abnl thyroid study).  \par \par RTC for PCAP/ NIPS @10wks\par SAB precautions,  PNV\par Graciela Rasmussen, PAC

## 2020-09-16 LAB — T4 FREE+ TSH PNL SERPL: 1.92 UIU/ML — SIGNIFICANT CHANGE UP (ref 0.27–4.2)

## 2020-10-21 ENCOUNTER — NON-APPOINTMENT (OUTPATIENT)
Age: 25
End: 2020-10-21

## 2020-10-21 ENCOUNTER — LABORATORY RESULT (OUTPATIENT)
Age: 25
End: 2020-10-21

## 2020-10-21 ENCOUNTER — APPOINTMENT (OUTPATIENT)
Dept: OBGYN | Facility: CLINIC | Age: 25
End: 2020-10-21
Payer: MEDICAID

## 2020-10-21 ENCOUNTER — RESULT CHARGE (OUTPATIENT)
Age: 25
End: 2020-10-21

## 2020-10-21 ENCOUNTER — APPOINTMENT (OUTPATIENT)
Dept: ANTEPARTUM | Facility: CLINIC | Age: 25
End: 2020-10-21

## 2020-10-21 ENCOUNTER — OUTPATIENT (OUTPATIENT)
Dept: OUTPATIENT SERVICES | Facility: HOSPITAL | Age: 25
LOS: 1 days | End: 2020-10-21
Payer: MEDICAID

## 2020-10-21 VITALS — WEIGHT: 184 LBS | SYSTOLIC BLOOD PRESSURE: 120 MMHG | DIASTOLIC BLOOD PRESSURE: 60 MMHG

## 2020-10-21 DIAGNOSIS — Z34.80 ENCOUNTER FOR SUPERVISION OF OTHER NORMAL PREGNANCY, UNSPECIFIED TRIMESTER: ICD-10-CM

## 2020-10-21 DIAGNOSIS — Z00.00 ENCOUNTER FOR GENERAL ADULT MEDICAL EXAMINATION W/OUT ABNORMAL FINDINGS: ICD-10-CM

## 2020-10-21 DIAGNOSIS — Z34.90 ENCOUNTER FOR SUPERVISION OF NORMAL PREGNANCY, UNSPECIFIED, UNSPECIFIED TRIMESTER: ICD-10-CM

## 2020-10-21 LAB
BILIRUB UR QL STRIP: NEGATIVE
GLUCOSE UR-MCNC: NEGATIVE
HCG UR QL: 1 EU/DL
HGB UR QL STRIP.AUTO: NEGATIVE
KETONES UR-MCNC: NEGATIVE
LEUKOCYTE ESTERASE UR QL STRIP: NEGATIVE
NITRITE UR QL STRIP: NEGATIVE
PH UR STRIP: 6.5
PROT UR STRIP-MCNC: NEGATIVE
SP GR UR STRIP: 1.02

## 2020-10-21 PROCEDURE — 86900 BLOOD TYPING SEROLOGIC ABO: CPT

## 2020-10-21 PROCEDURE — 86780 TREPONEMA PALLIDUM: CPT

## 2020-10-21 PROCEDURE — 86762 RUBELLA ANTIBODY: CPT

## 2020-10-21 PROCEDURE — 87086 URINE CULTURE/COLONY COUNT: CPT

## 2020-10-21 PROCEDURE — 86480 TB TEST CELL IMMUN MEASURE: CPT

## 2020-10-21 PROCEDURE — 86765 RUBEOLA ANTIBODY: CPT

## 2020-10-21 PROCEDURE — 83020 HEMOGLOBIN ELECTROPHORESIS: CPT | Mod: 26

## 2020-10-21 PROCEDURE — 81420 FETAL CHRMOML ANEUPLOIDY: CPT

## 2020-10-21 PROCEDURE — 83020 HEMOGLOBIN ELECTROPHORESIS: CPT

## 2020-10-21 PROCEDURE — XXXXX: CPT

## 2020-10-21 PROCEDURE — 87491 CHLMYD TRACH DNA AMP PROBE: CPT

## 2020-10-21 PROCEDURE — 36415 COLL VENOUS BLD VENIPUNCTURE: CPT

## 2020-10-21 PROCEDURE — 87389 HIV-1 AG W/HIV-1&-2 AB AG IA: CPT

## 2020-10-21 PROCEDURE — 86850 RBC ANTIBODY SCREEN: CPT

## 2020-10-21 PROCEDURE — 83655 ASSAY OF LEAD: CPT

## 2020-10-21 PROCEDURE — 81329 SMN1 GENE DOS/DELETION ALYS: CPT

## 2020-10-21 PROCEDURE — 86901 BLOOD TYPING SEROLOGIC RH(D): CPT

## 2020-10-21 PROCEDURE — 84443 ASSAY THYROID STIM HORMONE: CPT

## 2020-10-21 PROCEDURE — 85027 COMPLETE CBC AUTOMATED: CPT

## 2020-10-21 PROCEDURE — 81243 FMR1 GEN ALY DETC ABNL ALLEL: CPT

## 2020-10-21 PROCEDURE — 99213 OFFICE O/P EST LOW 20 MIN: CPT | Mod: TH,GC

## 2020-10-21 PROCEDURE — 80053 COMPREHEN METABOLIC PANEL: CPT

## 2020-10-21 PROCEDURE — 81220 CFTR GENE COM VARIANTS: CPT

## 2020-10-21 PROCEDURE — 87340 HEPATITIS B SURFACE AG IA: CPT

## 2020-10-21 PROCEDURE — 87591 N.GONORRHOEAE DNA AMP PROB: CPT

## 2020-10-21 RX ORDER — CABERGOLINE 0.5 MG/1
0.5 TABLET ORAL
Qty: 4 | Refills: 5 | Status: DISCONTINUED | COMMUNITY
Start: 2020-08-18 | End: 2020-10-21

## 2020-10-21 RX ORDER — MISOPROSTOL 200 UG/1
200 TABLET ORAL
Qty: 2 | Refills: 0 | Status: DISCONTINUED | COMMUNITY
Start: 2017-03-08 | End: 2020-10-21

## 2020-10-21 RX ORDER — NORETHINDRONE ACETATE AND ETHINYL ESTRADIOL 1; 20 MG/1; UG/1
1-20 TABLET ORAL DAILY
Qty: 30 | Refills: 11 | Status: DISCONTINUED | COMMUNITY
Start: 2020-04-01 | End: 2020-10-21

## 2020-10-22 ENCOUNTER — LABORATORY RESULT (OUTPATIENT)
Age: 25
End: 2020-10-22

## 2020-10-22 LAB
ALBUMIN SERPL ELPH-MCNC: 4.3 G/DL — SIGNIFICANT CHANGE UP (ref 3.3–5)
ALP SERPL-CCNC: 50 U/L — SIGNIFICANT CHANGE UP (ref 40–120)
ALT FLD-CCNC: 9 U/L — LOW (ref 10–45)
ANION GAP SERPL CALC-SCNC: 12 MMOL/L — SIGNIFICANT CHANGE UP (ref 5–17)
AST SERPL-CCNC: 12 U/L — SIGNIFICANT CHANGE UP (ref 10–40)
BILIRUB SERPL-MCNC: 0.3 MG/DL — SIGNIFICANT CHANGE UP (ref 0.2–1.2)
BUN SERPL-MCNC: 4 MG/DL — LOW (ref 7–23)
C TRACH RRNA SPEC QL NAA+PROBE: SIGNIFICANT CHANGE UP
CALCIUM SERPL-MCNC: 9.3 MG/DL — SIGNIFICANT CHANGE UP (ref 8.4–10.5)
CHLORIDE SERPL-SCNC: 104 MMOL/L — SIGNIFICANT CHANGE UP (ref 96–108)
CO2 SERPL-SCNC: 22 MMOL/L — SIGNIFICANT CHANGE UP (ref 22–31)
CREAT SERPL-MCNC: 0.39 MG/DL — LOW (ref 0.5–1.3)
GLUCOSE SERPL-MCNC: 86 MG/DL — SIGNIFICANT CHANGE UP (ref 70–99)
HBV SURFACE AG SER-ACNC: SIGNIFICANT CHANGE UP
HCT VFR BLD CALC: 39.6 % — SIGNIFICANT CHANGE UP (ref 34.5–45)
HEMOGLOBIN INTERPRETATION: SIGNIFICANT CHANGE UP
HGB A MFR BLD: 97.7 % — SIGNIFICANT CHANGE UP (ref 95.8–98)
HGB A2 MFR BLD: 2.3 % — SIGNIFICANT CHANGE UP (ref 2–3.2)
HGB BLD-MCNC: 13 G/DL — SIGNIFICANT CHANGE UP (ref 11.5–15.5)
HIV 1+2 AB+HIV1 P24 AG SERPL QL IA: SIGNIFICANT CHANGE UP
LEAD BLD-MCNC: <1 UG/DL — SIGNIFICANT CHANGE UP (ref 0–4)
MCHC RBC-ENTMCNC: 29.4 PG — SIGNIFICANT CHANGE UP (ref 27–34)
MCHC RBC-ENTMCNC: 32.8 GM/DL — SIGNIFICANT CHANGE UP (ref 32–36)
MCV RBC AUTO: 89.6 FL — SIGNIFICANT CHANGE UP (ref 80–100)
MEV IGG SER-ACNC: 115 AU/ML — SIGNIFICANT CHANGE UP
MEV IGG+IGM SER-IMP: POSITIVE — SIGNIFICANT CHANGE UP
N GONORRHOEA RRNA SPEC QL NAA+PROBE: SIGNIFICANT CHANGE UP
PLATELET # BLD AUTO: 260 K/UL — SIGNIFICANT CHANGE UP (ref 150–400)
POTASSIUM SERPL-MCNC: 3.9 MMOL/L — SIGNIFICANT CHANGE UP (ref 3.5–5.3)
POTASSIUM SERPL-SCNC: 3.9 MMOL/L — SIGNIFICANT CHANGE UP (ref 3.5–5.3)
PROT SERPL-MCNC: 6.9 G/DL — SIGNIFICANT CHANGE UP (ref 6–8.3)
RBC # BLD: 4.42 M/UL — SIGNIFICANT CHANGE UP (ref 3.8–5.2)
RBC # FLD: 12.8 % — SIGNIFICANT CHANGE UP (ref 10.3–14.5)
RUBV IGG SER-ACNC: 3.2 INDEX — SIGNIFICANT CHANGE UP
RUBV IGG SER-IMP: POSITIVE — SIGNIFICANT CHANGE UP
SODIUM SERPL-SCNC: 138 MMOL/L — SIGNIFICANT CHANGE UP (ref 135–145)
SPECIMEN SOURCE: SIGNIFICANT CHANGE UP
T PALLIDUM AB TITR SER: NEGATIVE — SIGNIFICANT CHANGE UP
TSH SERPL-MCNC: 1.75 UIU/ML — SIGNIFICANT CHANGE UP (ref 0.27–4.2)
WBC # BLD: 8.74 K/UL — SIGNIFICANT CHANGE UP (ref 3.8–10.5)
WBC # FLD AUTO: 8.74 K/UL — SIGNIFICANT CHANGE UP (ref 3.8–10.5)

## 2020-10-23 LAB
CULTURE RESULTS: SIGNIFICANT CHANGE UP
GAMMA INTERFERON BACKGROUND BLD IA-ACNC: 0.01 IU/ML — SIGNIFICANT CHANGE UP
M TB IFN-G BLD-IMP: NEGATIVE — SIGNIFICANT CHANGE UP
M TB IFN-G CD4+ BCKGRND COR BLD-ACNC: 0.01 IU/ML — SIGNIFICANT CHANGE UP
M TB IFN-G CD4+CD8+ BCKGRND COR BLD-ACNC: 0.01 IU/ML — SIGNIFICANT CHANGE UP
QUANT TB PLUS MITOGEN MINUS NIL: 6.02 IU/ML — SIGNIFICANT CHANGE UP
SPECIMEN SOURCE: SIGNIFICANT CHANGE UP

## 2020-10-26 LAB — FRAGILE X PROTEIN (FMRP) PNL BLD: SIGNIFICANT CHANGE UP

## 2020-10-27 LAB — SPINAL MUSCULAR ATROPHY: SIGNIFICANT CHANGE UP

## 2020-10-29 ENCOUNTER — LABORATORY RESULT (OUTPATIENT)
Age: 25
End: 2020-10-29

## 2020-10-29 LAB
CHR 21 TS BLD/T QL: SIGNIFICANT CHANGE UP
CLARI TEST NIPT W/MICRO - RESULTS: SIGNIFICANT CHANGE UP
CLARIM 15Q11.2: SIGNIFICANT CHANGE UP
CLARIM 1P36: SIGNIFICANT CHANGE UP
CLARIM 22Q11.2: SIGNIFICANT CHANGE UP
CLARIM 4P-/WOLF-HIRSCHHORN: SIGNIFICANT CHANGE UP
CLARIM 5P-/CRI DU CHAT: SIGNIFICANT CHANGE UP
CLARIM ADDITIONAL INFO: SIGNIFICANT CHANGE UP
CLARIM CHROMOSOME 13: SIGNIFICANT CHANGE UP
CLARIM CHROMOSOME 18: SIGNIFICANT CHANGE UP
CLARIM SEX CHROMOSOMES: SIGNIFICANT CHANGE UP
CYSTIC FIBROSIS EXPANDED PANEL: SIGNIFICANT CHANGE UP

## 2020-10-30 ENCOUNTER — ASOB RESULT (OUTPATIENT)
Age: 25
End: 2020-10-30

## 2020-10-30 ENCOUNTER — APPOINTMENT (OUTPATIENT)
Dept: ANTEPARTUM | Facility: CLINIC | Age: 25
End: 2020-10-30
Payer: MEDICAID

## 2020-10-30 PROCEDURE — 76813 OB US NUCHAL MEAS 1 GEST: CPT

## 2020-10-30 PROCEDURE — 36416 COLLJ CAPILLARY BLOOD SPEC: CPT

## 2020-10-30 PROCEDURE — 99072 ADDL SUPL MATRL&STAF TM PHE: CPT

## 2020-10-30 PROCEDURE — 76805 OB US >/= 14 WKS SNGL FETUS: CPT

## 2020-10-30 PROCEDURE — 76801 OB US < 14 WKS SINGLE FETUS: CPT

## 2020-11-05 ENCOUNTER — APPOINTMENT (OUTPATIENT)
Dept: MRI IMAGING | Facility: CLINIC | Age: 25
End: 2020-11-05

## 2020-11-18 ENCOUNTER — OUTPATIENT (OUTPATIENT)
Dept: OUTPATIENT SERVICES | Facility: HOSPITAL | Age: 25
LOS: 1 days | End: 2020-11-18
Payer: MEDICAID

## 2020-11-18 ENCOUNTER — LABORATORY RESULT (OUTPATIENT)
Age: 25
End: 2020-11-18

## 2020-11-18 ENCOUNTER — APPOINTMENT (OUTPATIENT)
Dept: OBGYN | Facility: CLINIC | Age: 25
End: 2020-11-18
Payer: MEDICAID

## 2020-11-18 VITALS — SYSTOLIC BLOOD PRESSURE: 102 MMHG | DIASTOLIC BLOOD PRESSURE: 66 MMHG | WEIGHT: 188 LBS

## 2020-11-18 DIAGNOSIS — Z34.80 ENCOUNTER FOR SUPERVISION OF OTHER NORMAL PREGNANCY, UNSPECIFIED TRIMESTER: ICD-10-CM

## 2020-11-18 PROCEDURE — 84704 HCG FREE BETACHAIN TEST: CPT

## 2020-11-18 PROCEDURE — 81003 URINALYSIS AUTO W/O SCOPE: CPT

## 2020-11-18 PROCEDURE — 99213 OFFICE O/P EST LOW 20 MIN: CPT | Mod: NC,TH

## 2020-11-18 PROCEDURE — 86336 INHIBIN A: CPT

## 2020-11-18 PROCEDURE — 82105 ALPHA-FETOPROTEIN SERUM: CPT

## 2020-11-18 PROCEDURE — 82677 ASSAY OF ESTRIOL: CPT

## 2020-11-18 PROCEDURE — G0463: CPT

## 2020-11-18 PROCEDURE — 84702 CHORIONIC GONADOTROPIN TEST: CPT

## 2020-11-20 ENCOUNTER — NON-APPOINTMENT (OUTPATIENT)
Age: 25
End: 2020-11-20

## 2020-11-20 LAB
1ST TRIMESTER DATA: SIGNIFICANT CHANGE UP
2ND TRIMESTER DATA: SIGNIFICANT CHANGE UP
AFP SERPL-ACNC: SIGNIFICANT CHANGE UP
AFP SERPL-ACNC: SIGNIFICANT CHANGE UP
B-HCG FREE SERPL-MCNC: SIGNIFICANT CHANGE UP
B-HCG FREE SERPL-MCNC: SIGNIFICANT CHANGE UP
CLINICAL BIOCHEMIST REVIEW: SIGNIFICANT CHANGE UP
DEMOGRAPHIC DATA: SIGNIFICANT CHANGE UP
INHIBIN A SERPL-MCNC: SIGNIFICANT CHANGE UP
NT: SIGNIFICANT CHANGE UP
PAPP-A SERPL-ACNC: SIGNIFICANT CHANGE UP
SCREEN-FOOTER: SIGNIFICANT CHANGE UP
U ESTRIOL SERPL-SCNC: SIGNIFICANT CHANGE UP

## 2020-11-27 DIAGNOSIS — Z34.92 ENCOUNTER FOR SUPERVISION OF NORMAL PREGNANCY, UNSPECIFIED, SECOND TRIMESTER: ICD-10-CM

## 2020-11-27 DIAGNOSIS — D35.2 BENIGN NEOPLASM OF PITUITARY GLAND: ICD-10-CM

## 2020-12-02 ENCOUNTER — EMERGENCY (EMERGENCY)
Facility: HOSPITAL | Age: 25
LOS: 1 days | Discharge: HOME | End: 2020-12-02
Attending: EMERGENCY MEDICINE
Payer: MEDICAID

## 2020-12-02 ENCOUNTER — APPOINTMENT (OUTPATIENT)
Dept: ENDOCRINOLOGY | Facility: CLINIC | Age: 25
End: 2020-12-02

## 2020-12-02 VITALS
RESPIRATION RATE: 16 BRPM | SYSTOLIC BLOOD PRESSURE: 119 MMHG | HEIGHT: 63 IN | DIASTOLIC BLOOD PRESSURE: 71 MMHG | HEART RATE: 82 BPM | WEIGHT: 184.09 LBS | OXYGEN SATURATION: 97 % | TEMPERATURE: 98 F

## 2020-12-02 VITALS
OXYGEN SATURATION: 99 % | HEART RATE: 78 BPM | RESPIRATION RATE: 18 BRPM | TEMPERATURE: 98 F | DIASTOLIC BLOOD PRESSURE: 78 MMHG | SYSTOLIC BLOOD PRESSURE: 124 MMHG

## 2020-12-02 LAB
ALBUMIN SERPL ELPH-MCNC: 3.3 G/DL — SIGNIFICANT CHANGE UP (ref 3.3–5)
ALP SERPL-CCNC: 51 U/L — SIGNIFICANT CHANGE UP (ref 40–120)
ALT FLD-CCNC: 10 U/L — SIGNIFICANT CHANGE UP (ref 10–45)
ANION GAP SERPL CALC-SCNC: 11 MMOL/L — SIGNIFICANT CHANGE UP (ref 5–17)
APPEARANCE UR: ABNORMAL
APTT BLD: 29.7 SEC — SIGNIFICANT CHANGE UP (ref 27.5–35.5)
AST SERPL-CCNC: 14 U/L — SIGNIFICANT CHANGE UP (ref 10–40)
BACTERIA # UR AUTO: NEGATIVE — SIGNIFICANT CHANGE UP
BASOPHILS # BLD AUTO: 0.04 K/UL — SIGNIFICANT CHANGE UP (ref 0–0.2)
BASOPHILS NFR BLD AUTO: 0.5 % — SIGNIFICANT CHANGE UP (ref 0–2)
BILIRUB SERPL-MCNC: 0.2 MG/DL — SIGNIFICANT CHANGE UP (ref 0.2–1.2)
BILIRUB UR-MCNC: NEGATIVE — SIGNIFICANT CHANGE UP
BUN SERPL-MCNC: 6 MG/DL — LOW (ref 7–23)
CALCIUM SERPL-MCNC: 9.2 MG/DL — SIGNIFICANT CHANGE UP (ref 8.4–10.5)
CHLORIDE SERPL-SCNC: 107 MMOL/L — SIGNIFICANT CHANGE UP (ref 96–108)
CO2 SERPL-SCNC: 21 MMOL/L — LOW (ref 22–31)
COLOR SPEC: ABNORMAL
CREAT SERPL-MCNC: 0.46 MG/DL — LOW (ref 0.5–1.3)
DIFF PNL FLD: NEGATIVE — SIGNIFICANT CHANGE UP
EOSINOPHIL # BLD AUTO: 0.12 K/UL — SIGNIFICANT CHANGE UP (ref 0–0.5)
EOSINOPHIL NFR BLD AUTO: 1.4 % — SIGNIFICANT CHANGE UP (ref 0–6)
EPI CELLS # UR: 1 /HPF — SIGNIFICANT CHANGE UP
GLUCOSE SERPL-MCNC: 87 MG/DL — SIGNIFICANT CHANGE UP (ref 70–99)
GLUCOSE UR QL: NEGATIVE — SIGNIFICANT CHANGE UP
HCG SERPL-ACNC: HIGH MIU/ML
HCT VFR BLD CALC: 34.6 % — SIGNIFICANT CHANGE UP (ref 34.5–45)
HGB BLD-MCNC: 11.7 G/DL — SIGNIFICANT CHANGE UP (ref 11.5–15.5)
HYALINE CASTS # UR AUTO: 1 /LPF — SIGNIFICANT CHANGE UP (ref 0–2)
IMM GRANULOCYTES NFR BLD AUTO: 1 % — SIGNIFICANT CHANGE UP (ref 0–1.5)
INR BLD: 1.02 RATIO — SIGNIFICANT CHANGE UP (ref 0.88–1.16)
KETONES UR-MCNC: NEGATIVE — SIGNIFICANT CHANGE UP
LEUKOCYTE ESTERASE UR-ACNC: ABNORMAL
LYMPHOCYTES # BLD AUTO: 1.52 K/UL — SIGNIFICANT CHANGE UP (ref 1–3.3)
LYMPHOCYTES # BLD AUTO: 17.5 % — SIGNIFICANT CHANGE UP (ref 13–44)
MCHC RBC-ENTMCNC: 29.4 PG — SIGNIFICANT CHANGE UP (ref 27–34)
MCHC RBC-ENTMCNC: 33.8 GM/DL — SIGNIFICANT CHANGE UP (ref 32–36)
MCV RBC AUTO: 86.9 FL — SIGNIFICANT CHANGE UP (ref 80–100)
MONOCYTES # BLD AUTO: 0.5 K/UL — SIGNIFICANT CHANGE UP (ref 0–0.9)
MONOCYTES NFR BLD AUTO: 5.8 % — SIGNIFICANT CHANGE UP (ref 2–14)
NEUTROPHILS # BLD AUTO: 6.42 K/UL — SIGNIFICANT CHANGE UP (ref 1.8–7.4)
NEUTROPHILS NFR BLD AUTO: 73.8 % — SIGNIFICANT CHANGE UP (ref 43–77)
NITRITE UR-MCNC: NEGATIVE — SIGNIFICANT CHANGE UP
NRBC # BLD: 0 /100 WBCS — SIGNIFICANT CHANGE UP (ref 0–0)
PH UR: 7.5 — SIGNIFICANT CHANGE UP (ref 5–8)
PLATELET # BLD AUTO: 187 K/UL — SIGNIFICANT CHANGE UP (ref 150–400)
POTASSIUM SERPL-MCNC: 3.6 MMOL/L — SIGNIFICANT CHANGE UP (ref 3.5–5.3)
POTASSIUM SERPL-SCNC: 3.6 MMOL/L — SIGNIFICANT CHANGE UP (ref 3.5–5.3)
PROT SERPL-MCNC: 6.4 G/DL — SIGNIFICANT CHANGE UP (ref 6–8.3)
PROT UR-MCNC: SIGNIFICANT CHANGE UP
PROTHROM AB SERPL-ACNC: 12.2 SEC — SIGNIFICANT CHANGE UP (ref 10.6–13.6)
RBC # BLD: 3.98 M/UL — SIGNIFICANT CHANGE UP (ref 3.8–5.2)
RBC # FLD: 12.8 % — SIGNIFICANT CHANGE UP (ref 10.3–14.5)
RBC CASTS # UR COMP ASSIST: 3 /HPF — SIGNIFICANT CHANGE UP (ref 0–4)
SODIUM SERPL-SCNC: 139 MMOL/L — SIGNIFICANT CHANGE UP (ref 135–145)
SP GR SPEC: 1.02 — SIGNIFICANT CHANGE UP (ref 1.01–1.02)
UROBILINOGEN FLD QL: NEGATIVE — SIGNIFICANT CHANGE UP
WBC # BLD: 8.69 K/UL — SIGNIFICANT CHANGE UP (ref 3.8–10.5)
WBC # FLD AUTO: 8.69 K/UL — SIGNIFICANT CHANGE UP (ref 3.8–10.5)
WBC UR QL: 3 /HPF — SIGNIFICANT CHANGE UP (ref 0–5)

## 2020-12-02 PROCEDURE — 99285 EMERGENCY DEPT VISIT HI MDM: CPT

## 2020-12-02 PROCEDURE — 85610 PROTHROMBIN TIME: CPT

## 2020-12-02 PROCEDURE — 84702 CHORIONIC GONADOTROPIN TEST: CPT

## 2020-12-02 PROCEDURE — 87086 URINE CULTURE/COLONY COUNT: CPT

## 2020-12-02 PROCEDURE — 70551 MRI BRAIN STEM W/O DYE: CPT | Mod: 26

## 2020-12-02 PROCEDURE — 70544 MR ANGIOGRAPHY HEAD W/O DYE: CPT

## 2020-12-02 PROCEDURE — 85730 THROMBOPLASTIN TIME PARTIAL: CPT

## 2020-12-02 PROCEDURE — 96360 HYDRATION IV INFUSION INIT: CPT

## 2020-12-02 PROCEDURE — 81001 URINALYSIS AUTO W/SCOPE: CPT

## 2020-12-02 PROCEDURE — 70551 MRI BRAIN STEM W/O DYE: CPT

## 2020-12-02 PROCEDURE — 99284 EMERGENCY DEPT VISIT MOD MDM: CPT | Mod: 25

## 2020-12-02 PROCEDURE — 80053 COMPREHEN METABOLIC PANEL: CPT

## 2020-12-02 PROCEDURE — 85025 COMPLETE CBC W/AUTO DIFF WBC: CPT

## 2020-12-02 PROCEDURE — 70544 MR ANGIOGRAPHY HEAD W/O DYE: CPT | Mod: 26,59

## 2020-12-02 RX ORDER — SODIUM CHLORIDE 9 MG/ML
1000 INJECTION INTRAMUSCULAR; INTRAVENOUS; SUBCUTANEOUS ONCE
Refills: 0 | Status: COMPLETED | OUTPATIENT
Start: 2020-12-02 | End: 2020-12-02

## 2020-12-02 RX ADMIN — SODIUM CHLORIDE 1000 MILLILITER(S): 9 INJECTION INTRAMUSCULAR; INTRAVENOUS; SUBCUTANEOUS at 15:10

## 2020-12-02 RX ADMIN — SODIUM CHLORIDE 1000 MILLILITER(S): 9 INJECTION INTRAMUSCULAR; INTRAVENOUS; SUBCUTANEOUS at 14:28

## 2020-12-02 NOTE — ED PROVIDER NOTE - PHYSICAL EXAMINATION
GEN: Pt in NAD, A&O x3.  PSYCH: Affect appropriate.  EYES: Sclera white w/o injection. PERRL, EOMI without pain, visual fields full OD, OS, OU by gross confrontation. VA: 20/200 OD, OS, OU with pocket Snellen at foot of the bed   ENT: Head NCAT. MMM. Neck supple FROM.  RESP: CTA b/l, no wheezes, rales, or rhonchi.   CARDIAC: RRR, clear distinct S1, S2, no appreciable murmurs.  ABD: Abdomen soft, non-tender. No CVAT b/l.  VASC: 2+ radial and dorsalis pedis pulses b/l. No edema or calf tenderness.  NEURO: CN2-12 grossly intact. Normal and equal sensation and 5/5 strength UE and LE b/l. Pronator drift negative. Normal gait and gross cerebellar function.   SKIN: No rashes on the trunk. GEN: Pt in NAD, A&O x3.  PSYCH: Affect appropriate.  EYES: Sclera white w/o injection. PERRL, EOMI without pain, visual fields full OD, OS, OU by gross confrontation. VA: 20/200 OD, OS, OU with pocket Snellen at foot of the bed, 20/100 OS and 20/50 OD from 2 foot distance.  ENT: Head NCAT. MMM. Neck supple FROM.  RESP: CTA b/l, no wheezes, rales, or rhonchi.   CARDIAC: RRR, clear distinct S1, S2, no appreciable murmurs.  ABD: Abdomen soft, non-tender. No CVAT b/l.  VASC: 2+ radial and dorsalis pedis pulses b/l. No edema or calf tenderness.  NEURO: CN2-12 grossly intact. Normal and equal sensation and 5/5 strength UE and LE b/l. Pronator drift negative. Normal gait and gross cerebellar function.   SKIN: No rashes on the trunk.

## 2020-12-02 NOTE — CONSULT NOTE ADULT - ASSESSMENT
26 yo female 17 weeks pregnant with history of pituitary microadenoma (likely prolactinoma) presents to the ED for bilateral throbbing frontal headache and intermittent blurry vision. Headaches are associated with photophobia and phonophobia. No transient visual obscurations or pulsatile tinnitus. VA 20/20 OU, no APD, color plates full, CVF full. Undilated fundus exam performed with normal appearing optic nerves, no edema present.    Frontal headaches  - Treatment of headaches per neurology  - MRI and MRV are unremarkable  - No disc edema present on exam, although the absence of disc edema does not rule out the possibility of increased ICP  - LP is being set up to be done outpatient with neurology    Refractive error  - Outpatient follow-up and refraction    Pituitary adenoma  - Full CVFs, but recommend [facility] visual field testing outpatient    The patient can follow-up with [facility] within 1 week of discharge:  [address]  [facility] [address]  [phone]

## 2020-12-02 NOTE — ED ADULT NURSE NOTE - CHPI ED NUR SYMPTOMS NEG
no vomiting/no dizziness/no change in level of consciousness/no weakness/no fever/no nausea/no numbness/no loss of consciousness/no confusion

## 2020-12-02 NOTE — ED PROVIDER NOTE - CLINICAL SUMMARY MEDICAL DECISION MAKING FREE TEXT BOX
Attending MD Rios: 25F 18weeks pregnant with h/o pituitary adenoma presenting for evaluation of headaches and blurred vision. Grossly nonfocal neuro exam, but will evaluate with MR/MRV, neurology and optho consultations.        *The above represents an initial assessment/impression. Please refer to progress notes for potential changes in patient clinical course*

## 2020-12-02 NOTE — CONSULT NOTE ADULT - ATTENDING COMMENTS
Pt presenting with worsening headaches, some positional component and episodic vision blurriness. MRI MRV negative, Wanted to have patient stay for an LP with opening to work up for IIH. Pt refused and left AMA.

## 2020-12-02 NOTE — ED PROVIDER NOTE - PATIENT PORTAL LINK FT
You can access the FollowMyHealth Patient Portal offered by [facility] by registering at the following website: [email or web address]. By joining [employer]’s FollowMyHealth portal, you will also be able to view your health information using other applications (apps) compatible with our system.

## 2020-12-02 NOTE — ED ADULT NURSE NOTE - OBJECTIVE STATEMENT
pt has a pituitary adenoma that she describes as "small"  she was seen and diagnosed by her endocrinologist without a scan.  she is also 18 weeks pregnant.   here today because she was sent by her endocrinologist for a mri due to visual changes.  she sees a person or object but cannot see faces or the area around

## 2020-12-02 NOTE — CONSULT NOTE ADULT - SUBJECTIVE AND OBJECTIVE BOX
HPI: 24 yo female  at 17 weeks and PMHx of pituitary microadenoma (likely prolactinoma) presents to the ED for headache and intermittent blurry vision. The patient states the headaches began ~2-3 weeks after finding out she was pregnant. She denies prior migraine or frequent HA history. She states these headaches always start as b/l frontal throbbing headaches that radiate occipitally. She states she has a headache every day. At their worst they are an 8/10 in severity. On average they are 5/10. She states is is often difficult for her to fall asleep due to the headaches. She hardly ever has a headache when she first wakes up. They develop throughout the day. She endorses photophobia and phonophobia. However she also states the headaches are at their worst when she is in complete darkness. She states lying down can be worse for the headaches and they can be slightly relieved by getting up and taking a walk outside. During the first trimester of her pregnancy she tried not to take any medications for the headaches. Now she takes Tylenol when the headaches are at their worst and they respond to medication, reducing in severity. Since she has started taking Tylenol she states she feels the headaches are better controlled. She also reports she was having frequent emesis during the first trimester which is now somewhat improved. The blurry vision began ~8 weeks ago. She states she has 2-3 episodes of blurry vision per week during which she cannot read close up or far away for 15-45 minutes before her vision returns to baseline. She states she can see colors clearly during these episodes. She states her headaches are typically worse in severity when the blurry vision occurs. She endorses occasional OS retroorbital pain, and OS pain on horizontal EOM. She has astigmatism and wears glasses for this at baseline, but she is waiting for her new glasses to come as her old glasses where scratched. She states her episodes of blurry vision where not improved by her glasses when she had them. She also endorses dizziness that she describes as lightheadedness when walking up stairs. Her endocrinologist sent her to the ED today to be evaluated and to get imaging done. Patient was having trouble getting imaging done as outpatient due to insurance issues. MRI and MRV brain done in the ED were unremarkable. Patient's neuro exam was nonfocal, fundoscopic exam unremarkable b/l. She states her current headache is 6/10. She received Tylenol earlier which has decreased the severity of her HA. She denies current blurry vision, she denies changes in speech, numbness/tingling, weakness.    ROS: All negative except as mentioned in HPI.    PAST MEDICAL & SURGICAL HISTORY:  Pituitary adenoma    No significant past surgical history    Allergies    No Known Allergies    Vital Signs Last 24 Hrs  T(C): 36.7 (02 Dec 2020 11:35), Max: 36.7 (02 Dec 2020 11:35)  T(F): 98 (02 Dec 2020 11:35), Max: 98 (02 Dec 2020 11:35)  HR: 80 (02 Dec 2020 11:35) (80 - 82)  BP: 122/77 (02 Dec 2020 11:35) (119/71 - 122/77)  RR: 18 (02 Dec 2020 11:35) (16 - 18)  SpO2: 98% (02 Dec 2020 11:35) (97% - 98%)    Exam:   MS: Eyes open spontaneously, alert. Oriented x3. Speech is clear, fluent. Follows all commands.   CN: PERRL. VFF. EOMI, no nystagmus. V1-V3 intact. Face symmetric. T/u midline.   Eyes, Fundoscopy: No papilledema b/l, otic disc margins clear b/l.  Motor: Full strength throughout.   Sensory: Intact to LT throughout.   Reflexes: 2+ throughout.  Coordination: No dysmetria on FNF or on HTS bilaterally.   Gait: Deferred.    LABS:                          11.7   8.69  )-----------( 187      ( 02 Dec 2020 12:08 )             34.6     12-02    139  |  107  |  6<L>  ----------------------------<  87  3.6   |  21<L>  |  0.46<L>    Ca    9.2      02 Dec 2020 12:08    TPro  6.4  /  Alb  3.3  /  TBili  0.2  /  DBili  x   /  AST  14  /  ALT  10  /  AlkPhos  51  12-02    PT/INR - ( 02 Dec 2020 12:44 )   PT: 12.2 sec;   INR: 1.02 ratio       PTT - ( 02 Dec 2020 12:44 )  PTT:29.7 sec    RADIOLOGY:    - MRI BRAIN: No hydrocephalus, mass effect, acute intracranial hemorrhage, vasogenic edema, or acute territorial infarct. No focal parenchymal signal abnormality. Flow voids are seen within the major intracranial vessels consistent with their patency.    - MRV BRAIN: No evidence for venous sinus or cortical vein thrombosis.         HPI: 24 yo female  at 17 weeks and PMHx of pituitary microadenoma (likely prolactinoma) presents to the ED for headache and intermittent blurry vision. The patient states the headaches began ~2-3 weeks after finding out she was pregnant. She denies prior migraine or frequent HA history. She states these headaches always start as b/l frontal throbbing headaches that radiate occipitally. She states she has a headache every day. At their worst they are an 8/10 in severity. On average they are 5/10. She states it is often difficult for her to fall asleep due to the headaches. She hardly ever has a headache when she first wakes up. They develop throughout the day. She endorses photophobia and phonophobia. However she also states the headaches are at their worst when she is in complete darkness. She states lying down can be worse for the headaches and they can be slightly relieved by getting up and taking a walk outside. During the first trimester of her pregnancy she tried not to take any medications for the headaches. Now she takes Tylenol when the headaches are at their worst and they respond to medication, reducing in severity. Since she has started taking Tylenol she states she feels the headaches are better controlled. She also reports she was having frequent emesis during the first trimester which is now somewhat improved. The blurry vision began ~8 weeks ago. She states she has 2-3 episodes of blurry vision per week during which she cannot read close up or far away for 15-45 minutes before her vision returns to baseline. She states she can see colors clearly during these episodes. She states her headaches are typically worse in severity when the blurry vision occurs. She endorses occasional OS retroorbital pain, and OS pain on horizontal EOM. She has astigmatism and wears glasses for this at baseline, but she is waiting for her new glasses to come as her old glasses were scratched. She states her episodes of blurry vision where not improved by her glasses when she had them. She also endorses dizziness that she describes as lightheadedness when walking up stairs. Her endocrinologist sent her to the ED today to be evaluated and to get imaging done. Patient was having trouble getting imaging done as outpatient due to insurance issues. MRI and MRV brain done in the ED were unremarkable. Patient's neuro exam was nonfocal, fundoscopic exam unremarkable b/l. She states her current headache is 6/10. She received Tylenol earlier which has decreased the severity of her HA. She endorses b/l pulsatile tinnitus. She denies current blurry vision, she denies changes in speech, numbness/tingling, weakness.    ROS: All negative except as mentioned in HPI.    PAST MEDICAL & SURGICAL HISTORY:  Pituitary adenoma    No significant past surgical history    Allergies    No Known Allergies    Vital Signs Last 24 Hrs  T(C): 36.7 (02 Dec 2020 11:35), Max: 36.7 (02 Dec 2020 11:35)  T(F): 98 (02 Dec 2020 11:35), Max: 98 (02 Dec 2020 11:35)  HR: 80 (02 Dec 2020 11:35) (80 - 82)  BP: 122/77 (02 Dec 2020 11:35) (119/71 - 122/77)  RR: 18 (02 Dec 2020 11:35) (16 - 18)  SpO2: 98% (02 Dec 2020 11:35) (97% - 98%)    Exam:   MS: Eyes open spontaneously, alert. Oriented x3. Speech is clear, fluent. Follows all commands.   CN: PERRL. VFF. EOMI, no nystagmus. V1-V3 intact. Face symmetric. T/u midline.   Eyes, Fundoscopy: No papilledema b/l, otic disc margins clear b/l.  Motor: Full strength throughout.   Sensory: Intact to LT throughout.   Reflexes: 2+ throughout.  Coordination: No dysmetria on FNF or on HTS bilaterally.   Gait: Deferred.    LABS:                          11.7   8.69  )-----------( 187      ( 02 Dec 2020 12:08 )             34.6     12-02    139  |  107  |  6<L>  ----------------------------<  87  3.6   |  21<L>  |  0.46<L>    Ca    9.2      02 Dec 2020 12:08    TPro  6.4  /  Alb  3.3  /  TBili  0.2  /  DBili  x   /  AST  14  /  ALT  10  /  AlkPhos  51  12-02    PT/INR - ( 02 Dec 2020 12:44 )   PT: 12.2 sec;   INR: 1.02 ratio       PTT - ( 02 Dec 2020 12:44 )  PTT:29.7 sec    RADIOLOGY:    - MRI BRAIN: No hydrocephalus, mass effect, acute intracranial hemorrhage, vasogenic edema, or acute territorial infarct. No focal parenchymal signal abnormality. Flow voids are seen within the major intracranial vessels consistent with their patency.    - MRV BRAIN: No evidence for venous sinus or cortical vein thrombosis.

## 2020-12-02 NOTE — ED PROVIDER NOTE - NSFOLLOWUPCLINICS_GEN_ALL_ED_FT
[facility]  Neurology  [address] [place] [address] - [address]rd [place] [address]  Phone: [phone]  Fax:   Follow Up Time: Urgent

## 2020-12-02 NOTE — ED PROVIDER NOTE - NSFOLLOWUPINSTRUCTIONS_ED_ALL_ED_FT
Neurology recommended lumbar puncture which you declined in the emergency department.   You are leaving against medical advice but are welcome to return to the ER at any time.  It is recommended that you have a lumbar puncture performed outpatient, you have been provided with follow-up information for the neurology clinic.  Please read all discharge paperwork attached.    1) Follow-up with your primary care provider in 1-2 days.      Follow-up with neurology in the clinic in 1-2 days.    2) Continue to take all medications as prescribed.    3) Rest and drink plenty of fluids. Pain can be managed with Acetaminophen (aka Tylenol) over the counter as directed.    4) Return to the ER for any new or worsening symptoms.     Please read all patient information.

## 2020-12-02 NOTE — CONSULT NOTE ADULT - SUBJECTIVE AND OBJECTIVE BOX
[facility] DEPARTMENT OF OPHTHALMOLOGY - INITIAL ADULT CONSULT  -----------------------------------------------------------------------------------------------------  Katharina Garcia MD PGY-3  Pager: [phone]  ------------------------------------------------------------------------------------------------------    HPI: 26 yo female 17 weeks pregnant with history of pituitary microadenoma (likely prolactinoma) presents to the ED for headache and intermittent blurry vision. The patient states the headaches began 2-3 weeks after finding out she was pregnant. She describes these as bilateral frontal throbbing, associated with photophobia and phonophobia. She also has a history of astigmatism for which she wears glasses. The glasses got scratched about 2 months ago. She has been trying to call [facility], where she gets her new prescriptions, for the past few weeks but has been unable to reach them. She reports that while she gets the headaches, her vision seems slightly more blurry. She reports improvement in the headache with Tylenol. Denies pulsatile tinnitus, transient visual obscurations, diplopia, flashes, or floaters. Not using any topical medications or oral medications, on a prenatal vitamin daily.     PAST MEDICAL & SURGICAL HISTORY:  Pituitary adenoma  No significant past surgical history    Past Ocular History: wears glasses, which are now broken    FAMILY HISTORY:  No pertinent family history in first degree relatives    Social History: denies smoking    Ophthalmic Medications: none    Allergies & Intolerances: NKDA    Review of Systems:  Constitutional: No fever, chills  Eyes: No flashes, floaters, FBS, erythema, discharge, double vision, OU  Neuro: No tremors  Cardiovascular: No chest pain, palpitations  Respiratory: No SOB, no cough  GI: No nausea, vomiting, abdominal pain  : No dysuria  Skin: no rash  Psych: no depression  Endocrine: no polyuria, polydipsia  Heme/lymph: no swelling    VITALS: T(C): 36.7 (12-02-20 @ 17:56)  T(F): 98 (12-02-20 @ 17:56), Max: 98 (12-02-20 @ 11:35)  HR: 78 (12-02-20 @ 17:56) (78 - 82)  BP: 124/78 (12-02-20 @ 17:56) (119/71 - 124/78)  RR:  (16 - 18)  SpO2:  (97% - 99%)    Alert and oriented x 3; appropriate mood and affect    Ophthalmology Exam:  Visual acuity (sc): 20/20 OU  Pupils: PERRL OU, no APD  Ttono: 16 OU  Extraocular movements (EOMs): Full OU, no pain, no diplopia  Confrontational Visual Field (CVF): Full OU  Color Plates: 12/12 OU    Pen Light Exam (PLE)  External: Flat OU  Lids/Lashes/Lacrimal Ducts: Flat OU    Sclera/Conjunctiva: W+Q OU  Cornea: Cl OU  Anterior Chamber: D+F OU    Iris: Flat OU  Lens: Cl OU    Fundus Exam was performed without dilation  Discussed the fact that 1% tropicamide and 2.5% phenylephrine are pregnancy category C medications. Patient preferred to have an undilated fundus exam.     Vitreous: wnl OU  Disc, cup/disc: sharp and pink, 0.4 OU, no edema or pallor  Macula: wnl OU  Vessels: wnl OU  Periphery: no view    Labs/Imaging:  MRI BRAIN:  No hydrocephalus, mass effect, acute intracranial hemorrhage, vasogenic edema, or acute territorial infarct.  No focal parenchymal signal abnormality.  Flow voids are seen within the major intracranial vessels consistent with their patency.    MRV BRAIN:  No evidence for venous sinus or cortical vein thrombosis.

## 2020-12-02 NOTE — CONSULT NOTE ADULT - ASSESSMENT
INCOMPLETE NOTE Assessment: 24 yo female  at 17 weeks and PMHx of pituitary microadenoma (likely prolactinoma) presents to the ED for headache and intermittent blurry vision. She states these headaches always start as b/l frontal throbbing headaches that radiate occipitally. She states she has a headache every day. At their worst they are an 8/10 in severity. On average they are 5/10. She endorses photophobia and phonophobia. The blurry vision began ~8 weeks ago. She states she has 2-3 episodes of blurry vision per week during which she cannot read close up or far away for 15-45 minutes before her vision returns to baseline. She states she can see colors clearly during these episodes. She states her headaches are typically worse in severity when the blurry vision occurs. MRI and MRV brain done in the ED were unremarkable. Patient's neuro exam was nonfocal, fundoscopic exam unremarkable b/l. Symptoms due to migraine headaches vs idiopathic intracranial hypertension.    Recommendations:  [] Would recommend ophthalmology consult.  [] Would recommend LP with opening pressure. Send CSF glucose, protein, and cell count. IF OPENING PRESSURE ELEVATED, WILL NEED LARGE VOLUME TAP (~25cc's CSF removed).  [] Tylenol PRN for HA pain.  [] Patient can follow up with general neurology at [address] [place] [address]. ([phone])     Case discussed with neurology attending Dr. Garces.

## 2020-12-02 NOTE — ED PROVIDER NOTE - OBJECTIVE STATEMENT
26 yo F PMHx of pituitary adenoma (prolactinoma?),  18 wks GA LMP 20 presents c/o HA since start of pregnancy, visual changes x 2 months. Pt notes blurry vision OU worse with distance, occasional pain to L eye, no difficulty with color vision, no vison loss, no u/l sxs, no peripheral vision loss or changes. Pt notes she has diagnosed astigmatism, typically wears glasses however her pair was scratched so she has not worn glasses for several weeks. HA is frontal, constant, waxing and waning, mild-moderate occasionally debilitating, currently mild relieved with 1000mg Tylenol taken PTA, no provoking factors, occasionally radiates to a generalized HA, pounding in nature. Pt notes n/v worse 1st trimester improving since 2nd trimester began. Pt denies SOB, CP, f/c, nuchal rigidity, photophobia, prior episodes, numbness, paresthesias, weakness, difficulty speaking/walking/swallowing, fhx of migraines or neurologic pathology. Pt reports telling her outpatient providers about her HA and visual changes, has not been able to have outpt MRI due to insurance issues, seen by her endocrinologist today and referred to ED for imaging.

## 2020-12-02 NOTE — ED PROVIDER NOTE - PROGRESS NOTE DETAILS
Attending MD Rios: MR/MRV negative. Neuro has updated recs and recommend LP for opening pressure to r/o pseudotumor. Patient is hesitant to perform test, patient would like to consider it first. Attending MD Rios: patient declines LP. She is aware that misdiagnosis of pseudotumor cerebri (though overall suspicion is low for this) could lead to progressive vision loss, she verbalizes her understanding of this and prefers to follow up with neurology as outpatient. Will update neurology with patient's preference to defer LP Ophthalmology evaluated the pt, recommend outpt f/u in the ophthalmology clinic, pt will be called tomorrow for appt. Ophtho resident reports VA 20/20 with close vision, no papilledema. d/w neurology who would still like LP in ED, pt declining LP and will thus be leaving AMA. The patient is leaving against my medical advice.  I have informed the patient about the risks, benefits, and alternatives to the evaluation and treatment of their condition.  The patient reports understanding of these issues and has the capacity and insight to make important medical decisions. The necessity to follow up with PMD/Clinic/follow up provided within 2-3 days was explained. The patient understands that this decision to go against medical advice can be changed at any time and the patient can return for re-evaluation and further treatment with any changes in condition or concerns.  The patient understands all the reasons to return to the Emergency Department, including any concerns at all, the patient reports understanding of above with capacity and insight. -Randolph Peres PA-C Attending MD Reyna: The patient wants to leave the hospital against medical advice.  The patient understands the risks, benefits and alternatives of this decision.  The risks of losing vision, permanent disability and death explained to patient and the patient demonstrated understanding of these risks.  The patient was instructed to follow-up immediately with their primary physician and neurology. Follow up instructions given, importance of follow up emphasized, return to ED parameters reviewed and patient verbalized understanding.  All questions answered, all concerns addressed.

## 2020-12-03 PROBLEM — D35.2 BENIGN NEOPLASM OF PITUITARY GLAND: Chronic | Status: ACTIVE | Noted: 2020-12-02

## 2020-12-03 LAB
CULTURE RESULTS: NO GROWTH — SIGNIFICANT CHANGE UP
SPECIMEN SOURCE: SIGNIFICANT CHANGE UP

## 2020-12-04 ENCOUNTER — APPOINTMENT (OUTPATIENT)
Dept: ENDOCRINOLOGY | Facility: CLINIC | Age: 25
End: 2020-12-04
Payer: MEDICAID

## 2020-12-04 VITALS
WEIGHT: 188 LBS | HEIGHT: 60 IN | TEMPERATURE: 97.1 F | BODY MASS INDEX: 36.91 KG/M2 | SYSTOLIC BLOOD PRESSURE: 110 MMHG | DIASTOLIC BLOOD PRESSURE: 60 MMHG | OXYGEN SATURATION: 98 % | HEART RATE: 90 BPM

## 2020-12-04 PROCEDURE — 99214 OFFICE O/P EST MOD 30 MIN: CPT

## 2020-12-04 PROCEDURE — 99072 ADDL SUPL MATRL&STAF TM PHE: CPT

## 2020-12-08 ENCOUNTER — OUTPATIENT (OUTPATIENT)
Dept: OUTPATIENT SERVICES | Facility: HOSPITAL | Age: 25
LOS: 1 days | End: 2020-12-08
Payer: MEDICAID

## 2020-12-08 ENCOUNTER — APPOINTMENT (OUTPATIENT)
Dept: NEUROLOGY | Facility: HOSPITAL | Age: 25
End: 2020-12-08

## 2020-12-08 VITALS
RESPIRATION RATE: 14 BRPM | TEMPERATURE: 97.4 F | WEIGHT: 184 LBS | BODY MASS INDEX: 36.12 KG/M2 | SYSTOLIC BLOOD PRESSURE: 111 MMHG | HEIGHT: 60 IN | HEART RATE: 85 BPM | DIASTOLIC BLOOD PRESSURE: 74 MMHG

## 2020-12-08 DIAGNOSIS — R51.9 HEADACHE, UNSPECIFIED: ICD-10-CM

## 2020-12-08 PROCEDURE — G0463: CPT

## 2020-12-08 RX ORDER — OMEGA-3/DHA/EPA/FISH OIL 300-1000MG
400 CAPSULE ORAL DAILY
Qty: 30 | Refills: 0 | Status: ACTIVE | COMMUNITY
Start: 2020-12-08 | End: 1900-01-01

## 2020-12-08 RX ORDER — MELATONIN 3 MG
3 CAPSULE ORAL
Qty: 30 | Refills: 0 | Status: ACTIVE | COMMUNITY
Start: 2020-12-08 | End: 1900-01-01

## 2020-12-08 NOTE — REVIEW OF SYSTEMS
[Migraine Headache] : migraine headaches [Vomiting] : vomiting [Fever] : no fever [Chills] : no chills [Confused or Disoriented] : no confusion [Facial Weakness] : no facial weakness [Arm Weakness] : no arm weakness [Hand Weakness] : no hand weakness [Leg Weakness] : no leg weakness [Poor Coordination] : good coordination [Difficulty Writing] : no difficulty writing [Difficulties in Speech] : no speech difficulties [Numbness] : no numbness [Tingling] : no tingling [Abnormal Sensation] : no abnormal sensation [Lightheadedness] : no lightheadedness [Vertigo] : no vertigo [Difficulty Walking] : no difficulty walking [Inability to Walk] : able to walk [Ataxia] : no ataxia [Anxiety] : no anxiety [Depression] : no depression [Eye Pain] : no eye pain [Chest Pain] : no chest pain [Palpitations] : no palpitations [Shortness Of Breath] : no shortness of breath [Wheezing] : no wheezing [Cough] : no cough [SOB on Exertion] : no shortness of breath during exertion [Abdominal Pain] : no abdominal pain [Constipation] : no constipation [Diarrhea] : no diarrhea [FreeTextEntry3] : Blurry vision

## 2020-12-08 NOTE — PHYSICAL EXAM
[General Appearance - Alert] : alert [General Appearance - In No Acute Distress] : in no acute distress [General Appearance - Well Developed] : well developed [Person] : oriented to person [Place] : oriented to place [Time] : oriented to time [Cranial Nerves Optic (II)] : visual acuity intact bilaterally,  visual fields full to confrontation, pupils equal round and reactive to light [Cranial Nerves Oculomotor (III)] : extraocular motion intact [Cranial Nerves Trigeminal (V)] : facial sensation intact symmetrically [Cranial Nerves Facial (VII)] : face symmetrical [Cranial Nerves Vestibulocochlear (VIII)] : hearing was intact bilaterally [Cranial Nerves Glossopharyngeal (IX)] : tongue and palate midline [Cranial Nerves Accessory (XI - Cranial And Spinal)] : head turning and shoulder shrug symmetric [Cranial Nerves Hypoglossal (XII)] : there was no tongue deviation with protrusion [Motor Tone] : muscle tone was normal in all four extremities [Motor Strength] : muscle strength was normal in all four extremities [Involuntary Movements] : no involuntary movements were seen [No Muscle Atrophy] : normal bulk in all four extremities [Motor Handedness Right-Handed] : the patient is right hand dominant [Sensation Tactile Decrease] : light touch was intact [Balance] : balance was intact [Non-ambulatory] : Non-ambulatory [2+] : Brachioradialis left 2+ [1+] : Patella left 1+ [Sclera] : the sclera and conjunctiva were normal [PERRL With Normal Accommodation] : pupils were equal in size, round, reactive to light, with normal accommodation [Extraocular Movements] : extraocular movements were intact [Optic Disc Abnormality] : the optic disc were normal in size and color [No APD] : no afferent pupillary defect [Full Visual Field] : full visual field [Abnormal Walk] : normal gait [Nail Clubbing] : no clubbing  or cyanosis of the fingernails [Skin Color & Pigmentation] : normal skin color and pigmentation [] : no rash [Skin Lesions] : no skin lesions [Coordination - Dysmetria Impaired Finger-to-Nose Bilateral] : not present [Plantar Reflex Right Only] : normal on the right [Plantar Reflex Left Only] : normal on the left [FreeTextEntry1] : P

## 2020-12-08 NOTE — DISCUSSION/SUMMARY
[FreeTextEntry1] : 24 y/o right-handed female, 18 weeks pregnant, with PMH pituitary microadenoma (likely prolactinoma) presents as new patient to clinic for intractable frontal headaches that started at onset of pregnancy. Imaging reviewed. Venous sinus thrombosis ruled out. Prolactinoma appears to be approximately the same size on MRI brain done 12/2/20 when compared to 8/2020. Headaches are likely migrainous in the setting of hormonal fluctuations during pregnancy. \par \par Plan:\par -Pt counseled on headache related to pregnancy, including exacerbation with sleep deprivation\par -Start Magnesium 400mg daily after speaking with OBGYN\par -Start Melatonin 3mg daily\par -Ophthalmology referral provided\par -Continue Tylenol for pain control. Counseling provided on overuse. \par -Pt to follow up with OBGYN and Endocrinologist as outpatient\par -RTC in 2 months.

## 2020-12-08 NOTE — HISTORY OF PRESENT ILLNESS
[FreeTextEntry1] : 26 y/o right handed female, 18 weeks pregnant, with PMH pituitary microadenoma (likely prolactinoma) presents as new patient to clinic, referred from ED for headache. She was recently seen at [facility] for headache. MRI brain/MRV unremarkable. She was recommended for LP but left AMA as she did not want to stay overnight. No history of migraines. Growing up as a teenager, patient would have occasional headaches after exertion. She does not remember having headaches as a child. She has mild light sensitivity, phonophobia, nausea, vomiting. Pt reports headaches seem to be worse in complete darkness. More recently since Friday she has been waking up with throbbing headaches everyday and she finds it hard to fall asleep. She reports the headaches start frontally and migrate to the back of her head, describes it as a throbbing type pain. She also has had intermittent blurry vision since September 2020 associated with the headaches. The episodes of blurry vision have increased frequency since September. This November, when she was in the car, she couldn't read the signs on the road.\par \par Patient currently complains of an 8/10 headache, she has mild light sensitivity. She has been taking Tylenol as advised, does not exceed 4,000mg daily. She has had nausea and vomiting for the duration of her pregnancy. She has difficulty going up stairs, she has a head spinning sensation. Leaning forward helps with the pain. She otherwise denies change in quality of her headache when lying down flat or coughing.\par \par

## 2020-12-08 NOTE — DATA REVIEWED
[de-identified] : Reviewed: \par \par MRI BRAIN:\par No hydrocephalus, mass effect, acute intracranial hemorrhage, vasogenic edema, or acute territorial infarct.\par No focal parenchymal signal abnormality.\par Flow voids are seen within the major intracranial vessels consistent with their patency.\par \par MRV BRAIN:\par No evidence for venous sinus or cortical vein thrombosis.

## 2020-12-10 ENCOUNTER — APPOINTMENT (OUTPATIENT)
Dept: OBGYN | Facility: CLINIC | Age: 25
End: 2020-12-10

## 2020-12-11 ENCOUNTER — ASOB RESULT (OUTPATIENT)
Age: 25
End: 2020-12-11

## 2020-12-11 ENCOUNTER — APPOINTMENT (OUTPATIENT)
Dept: ANTEPARTUM | Facility: CLINIC | Age: 25
End: 2020-12-11
Payer: MEDICAID

## 2020-12-11 PROCEDURE — 76811 OB US DETAILED SNGL FETUS: CPT

## 2020-12-11 PROCEDURE — 99072 ADDL SUPL MATRL&STAF TM PHE: CPT

## 2020-12-16 ENCOUNTER — OUTPATIENT (OUTPATIENT)
Dept: OUTPATIENT SERVICES | Facility: HOSPITAL | Age: 25
LOS: 1 days | End: 2020-12-16
Payer: MEDICAID

## 2020-12-16 ENCOUNTER — APPOINTMENT (OUTPATIENT)
Dept: OBGYN | Facility: CLINIC | Age: 25
End: 2020-12-16
Payer: MEDICAID

## 2020-12-16 VITALS — DIASTOLIC BLOOD PRESSURE: 80 MMHG | SYSTOLIC BLOOD PRESSURE: 130 MMHG | WEIGHT: 189 LBS

## 2020-12-16 VITALS — TEMPERATURE: 97.3 F

## 2020-12-16 DIAGNOSIS — Z34.80 ENCOUNTER FOR SUPERVISION OF OTHER NORMAL PREGNANCY, UNSPECIFIED TRIMESTER: ICD-10-CM

## 2020-12-16 DIAGNOSIS — Z34.92 ENCOUNTER FOR SUPERVISION OF NORMAL PREGNANCY, UNSPECIFIED, SECOND TRIMESTER: ICD-10-CM

## 2020-12-16 DIAGNOSIS — B37.3 CANDIDIASIS OF VULVA AND VAGINA: ICD-10-CM

## 2020-12-16 PROCEDURE — 99213 OFFICE O/P EST LOW 20 MIN: CPT

## 2020-12-16 PROCEDURE — G0463: CPT

## 2020-12-18 ENCOUNTER — NON-APPOINTMENT (OUTPATIENT)
Age: 25
End: 2020-12-18

## 2020-12-18 ENCOUNTER — APPOINTMENT (OUTPATIENT)
Dept: OPHTHALMOLOGY | Facility: CLINIC | Age: 25
End: 2020-12-18
Payer: MEDICAID

## 2020-12-18 DIAGNOSIS — R79.89 OTHER SPECIFIED ABNORMAL FINDINGS OF BLOOD CHEMISTRY: ICD-10-CM

## 2020-12-18 LAB — TSH SERPL-ACNC: 2.11 UIU/ML

## 2020-12-18 PROCEDURE — 92133 CPTRZD OPH DX IMG PST SGM ON: CPT

## 2020-12-18 PROCEDURE — 92083 EXTENDED VISUAL FIELD XM: CPT

## 2020-12-18 PROCEDURE — 92015 DETERMINE REFRACTIVE STATE: CPT

## 2020-12-18 PROCEDURE — 99072 ADDL SUPL MATRL&STAF TM PHE: CPT

## 2020-12-18 PROCEDURE — 92004 COMPRE OPH EXAM NEW PT 1/>: CPT

## 2021-01-13 ENCOUNTER — OUTPATIENT (OUTPATIENT)
Dept: OUTPATIENT SERVICES | Facility: HOSPITAL | Age: 26
LOS: 1 days | End: 2021-01-13
Payer: MEDICAID

## 2021-01-13 ENCOUNTER — APPOINTMENT (OUTPATIENT)
Dept: OBGYN | Facility: CLINIC | Age: 26
End: 2021-01-13
Payer: MEDICAID

## 2021-01-13 ENCOUNTER — LABORATORY RESULT (OUTPATIENT)
Age: 26
End: 2021-01-13

## 2021-01-13 VITALS
HEIGHT: 60 IN | DIASTOLIC BLOOD PRESSURE: 80 MMHG | SYSTOLIC BLOOD PRESSURE: 122 MMHG | BODY MASS INDEX: 38.36 KG/M2 | WEIGHT: 195.38 LBS

## 2021-01-13 VITALS — TEMPERATURE: 97.5 F

## 2021-01-13 DIAGNOSIS — Z34.80 ENCOUNTER FOR SUPERVISION OF OTHER NORMAL PREGNANCY, UNSPECIFIED TRIMESTER: ICD-10-CM

## 2021-01-13 DIAGNOSIS — Z34.92 ENCOUNTER FOR SUPERVISION OF NORMAL PREGNANCY, UNSPECIFIED, SECOND TRIMESTER: ICD-10-CM

## 2021-01-13 PROCEDURE — G0463: CPT

## 2021-01-13 PROCEDURE — 81003 URINALYSIS AUTO W/O SCOPE: CPT

## 2021-01-13 PROCEDURE — 82950 GLUCOSE TEST: CPT

## 2021-01-13 PROCEDURE — 99213 OFFICE O/P EST LOW 20 MIN: CPT

## 2021-01-13 RX ORDER — TERCONAZOLE 8 MG/G
0.8 CREAM VAGINAL DAILY
Qty: 1 | Refills: 0 | Status: DISCONTINUED | COMMUNITY
Start: 2020-12-16 | End: 2021-01-13

## 2021-01-14 LAB — GLUCOSE 1H P MEAL SERPL-MCNC: 91 MG/DL — SIGNIFICANT CHANGE UP (ref 70–134)

## 2021-02-03 ENCOUNTER — OUTPATIENT (OUTPATIENT)
Dept: OUTPATIENT SERVICES | Facility: HOSPITAL | Age: 26
LOS: 1 days | End: 2021-02-03
Payer: MEDICAID

## 2021-02-03 VITALS
SYSTOLIC BLOOD PRESSURE: 111 MMHG | HEART RATE: 81 BPM | DIASTOLIC BLOOD PRESSURE: 63 MMHG | RESPIRATION RATE: 18 BRPM | TEMPERATURE: 98 F

## 2021-02-03 VITALS — HEART RATE: 88 BPM | SYSTOLIC BLOOD PRESSURE: 111 MMHG | DIASTOLIC BLOOD PRESSURE: 64 MMHG

## 2021-02-03 DIAGNOSIS — O26.899 OTHER SPECIFIED PREGNANCY RELATED CONDITIONS, UNSPECIFIED TRIMESTER: ICD-10-CM

## 2021-02-03 DIAGNOSIS — Z3A.00 WEEKS OF GESTATION OF PREGNANCY NOT SPECIFIED: ICD-10-CM

## 2021-02-03 DIAGNOSIS — K21.9 GASTRO-ESOPHAGEAL REFLUX DISEASE W/OUT ESOPHAGITIS: ICD-10-CM

## 2021-02-03 LAB
ALBUMIN SERPL ELPH-MCNC: 3.7 G/DL — SIGNIFICANT CHANGE UP (ref 3.3–5)
ALP SERPL-CCNC: 80 U/L — SIGNIFICANT CHANGE UP (ref 40–120)
ALT FLD-CCNC: 8 U/L — LOW (ref 10–45)
AMYLASE P1 CFR SERPL: 61 U/L — SIGNIFICANT CHANGE UP (ref 25–125)
ANION GAP SERPL CALC-SCNC: 14 MMOL/L — SIGNIFICANT CHANGE UP (ref 5–17)
APPEARANCE UR: ABNORMAL
AST SERPL-CCNC: 12 U/L — SIGNIFICANT CHANGE UP (ref 10–40)
BACTERIA # UR AUTO: NEGATIVE — SIGNIFICANT CHANGE UP
BASOPHILS # BLD AUTO: 0.05 K/UL — SIGNIFICANT CHANGE UP (ref 0–0.2)
BASOPHILS NFR BLD AUTO: 0.5 % — SIGNIFICANT CHANGE UP (ref 0–2)
BILIRUB SERPL-MCNC: 0.2 MG/DL — SIGNIFICANT CHANGE UP (ref 0.2–1.2)
BILIRUB UR-MCNC: NEGATIVE — SIGNIFICANT CHANGE UP
BUN SERPL-MCNC: 5 MG/DL — LOW (ref 7–23)
CALCIUM SERPL-MCNC: 9.3 MG/DL — SIGNIFICANT CHANGE UP (ref 8.4–10.5)
CHLORIDE SERPL-SCNC: 102 MMOL/L — SIGNIFICANT CHANGE UP (ref 96–108)
CO2 SERPL-SCNC: 22 MMOL/L — SIGNIFICANT CHANGE UP (ref 22–31)
COLOR SPEC: YELLOW — SIGNIFICANT CHANGE UP
CREAT SERPL-MCNC: 0.4 MG/DL — LOW (ref 0.5–1.3)
DIFF PNL FLD: NEGATIVE — SIGNIFICANT CHANGE UP
EOSINOPHIL # BLD AUTO: 0.12 K/UL — SIGNIFICANT CHANGE UP (ref 0–0.5)
EOSINOPHIL NFR BLD AUTO: 1.3 % — SIGNIFICANT CHANGE UP (ref 0–6)
EPI CELLS # UR: 2 /HPF — SIGNIFICANT CHANGE UP
GLUCOSE SERPL-MCNC: 76 MG/DL — SIGNIFICANT CHANGE UP (ref 70–99)
GLUCOSE UR QL: NEGATIVE — SIGNIFICANT CHANGE UP
HCT VFR BLD CALC: 36.4 % — SIGNIFICANT CHANGE UP (ref 34.5–45)
HGB BLD-MCNC: 12.1 G/DL — SIGNIFICANT CHANGE UP (ref 11.5–15.5)
HYALINE CASTS # UR AUTO: 1 /LPF — SIGNIFICANT CHANGE UP (ref 0–2)
IMM GRANULOCYTES NFR BLD AUTO: 1.3 % — SIGNIFICANT CHANGE UP (ref 0–1.5)
KETONES UR-MCNC: NEGATIVE — SIGNIFICANT CHANGE UP
LEUKOCYTE ESTERASE UR-ACNC: NEGATIVE — SIGNIFICANT CHANGE UP
LIDOCAIN IGE QN: 17 U/L — SIGNIFICANT CHANGE UP (ref 7–60)
LYMPHOCYTES # BLD AUTO: 1.84 K/UL — SIGNIFICANT CHANGE UP (ref 1–3.3)
LYMPHOCYTES # BLD AUTO: 19.3 % — SIGNIFICANT CHANGE UP (ref 13–44)
MCHC RBC-ENTMCNC: 28.7 PG — SIGNIFICANT CHANGE UP (ref 27–34)
MCHC RBC-ENTMCNC: 33.2 GM/DL — SIGNIFICANT CHANGE UP (ref 32–36)
MCV RBC AUTO: 86.3 FL — SIGNIFICANT CHANGE UP (ref 80–100)
MONOCYTES # BLD AUTO: 0.49 K/UL — SIGNIFICANT CHANGE UP (ref 0–0.9)
MONOCYTES NFR BLD AUTO: 5.1 % — SIGNIFICANT CHANGE UP (ref 2–14)
NEUTROPHILS # BLD AUTO: 6.93 K/UL — SIGNIFICANT CHANGE UP (ref 1.8–7.4)
NEUTROPHILS NFR BLD AUTO: 72.5 % — SIGNIFICANT CHANGE UP (ref 43–77)
NITRITE UR-MCNC: NEGATIVE — SIGNIFICANT CHANGE UP
NRBC # BLD: 0 /100 WBCS — SIGNIFICANT CHANGE UP (ref 0–0)
PH UR: 8 — SIGNIFICANT CHANGE UP (ref 5–8)
PLATELET # BLD AUTO: 214 K/UL — SIGNIFICANT CHANGE UP (ref 150–400)
POTASSIUM SERPL-MCNC: 3.7 MMOL/L — SIGNIFICANT CHANGE UP (ref 3.5–5.3)
POTASSIUM SERPL-SCNC: 3.7 MMOL/L — SIGNIFICANT CHANGE UP (ref 3.5–5.3)
PROT SERPL-MCNC: 7.1 G/DL — SIGNIFICANT CHANGE UP (ref 6–8.3)
PROT UR-MCNC: NEGATIVE — SIGNIFICANT CHANGE UP
RBC # BLD: 4.22 M/UL — SIGNIFICANT CHANGE UP (ref 3.8–5.2)
RBC # FLD: 12.7 % — SIGNIFICANT CHANGE UP (ref 10.3–14.5)
RBC CASTS # UR COMP ASSIST: 1 /HPF — SIGNIFICANT CHANGE UP (ref 0–4)
SODIUM SERPL-SCNC: 138 MMOL/L — SIGNIFICANT CHANGE UP (ref 135–145)
SP GR SPEC: 1.02 — SIGNIFICANT CHANGE UP (ref 1.01–1.02)
UROBILINOGEN FLD QL: NEGATIVE — SIGNIFICANT CHANGE UP
WBC # BLD: 9.55 K/UL — SIGNIFICANT CHANGE UP (ref 3.8–10.5)
WBC # FLD AUTO: 9.55 K/UL — SIGNIFICANT CHANGE UP (ref 3.8–10.5)
WBC UR QL: 1 /HPF — SIGNIFICANT CHANGE UP (ref 0–5)

## 2021-02-03 PROCEDURE — 82150 ASSAY OF AMYLASE: CPT

## 2021-02-03 PROCEDURE — 59025 FETAL NON-STRESS TEST: CPT

## 2021-02-03 PROCEDURE — 83690 ASSAY OF LIPASE: CPT

## 2021-02-03 PROCEDURE — 99231 SBSQ HOSP IP/OBS SF/LOW 25: CPT | Mod: 25

## 2021-02-03 PROCEDURE — 81001 URINALYSIS AUTO W/SCOPE: CPT

## 2021-02-03 PROCEDURE — 85025 COMPLETE CBC W/AUTO DIFF WBC: CPT

## 2021-02-03 PROCEDURE — 80053 COMPREHEN METABOLIC PANEL: CPT

## 2021-02-03 PROCEDURE — 59025 FETAL NON-STRESS TEST: CPT | Mod: 26

## 2021-02-03 PROCEDURE — G0463: CPT

## 2021-02-03 RX ORDER — SIMETHICONE 80 MG/1
80 TABLET, CHEWABLE ORAL ONCE
Refills: 0 | Status: COMPLETED | OUTPATIENT
Start: 2021-02-03 | End: 2021-02-03

## 2021-02-03 RX ORDER — FAMOTIDINE 10 MG/ML
20 INJECTION INTRAVENOUS ONCE
Refills: 0 | Status: COMPLETED | OUTPATIENT
Start: 2021-02-03 | End: 2021-02-03

## 2021-02-03 RX ORDER — SODIUM CHLORIDE 9 MG/ML
1000 INJECTION, SOLUTION INTRAVENOUS ONCE
Refills: 0 | Status: COMPLETED | OUTPATIENT
Start: 2021-02-03 | End: 2021-02-03

## 2021-02-03 RX ORDER — FAMOTIDINE 20 MG/1
20 TABLET, FILM COATED ORAL
Qty: 60 | Refills: 2 | Status: ACTIVE | COMMUNITY
Start: 2021-02-03 | End: 1900-01-01

## 2021-02-03 RX ADMIN — SODIUM CHLORIDE 1000 MILLILITER(S): 9 INJECTION, SOLUTION INTRAVENOUS at 14:15

## 2021-02-03 RX ADMIN — FAMOTIDINE 20 MILLIGRAM(S): 10 INJECTION INTRAVENOUS at 14:36

## 2021-02-03 RX ADMIN — SIMETHICONE 80 MILLIGRAM(S): 80 TABLET, CHEWABLE ORAL at 15:27

## 2021-02-03 NOTE — OB PROVIDER TRIAGE NOTE - NSHPPHYSICALEXAM_GEN_ALL_CORE
Vital Signs Last 24 Hrs  T(C): 37 (03 Feb 2021 12:00), Max: 37 (03 Feb 2021 12:00)  T(F): 98.6 (03 Feb 2021 12:00), Max: 98.6 (03 Feb 2021 12:00)  HR: 92 (03 Feb 2021 12:59) (76 - 106)  BP: 113/61 (03 Feb 2021 12:51) (111/64 - 118/73)  RR: 15 (03 Feb 2021 12:00) (15 - 18)  SpO2: 99% (03 Feb 2021 12:59) (98% - 99%)    EXAM:  GEN: nad, a&ox3  CV: rrr  PULM: ctab  ABD: soft, gravid  EXT: wwp, nt  FHT: bl 130, mod alena, [id number], no decels  TOCO: no ctx Vital Signs Last 24 Hrs  T(C): 37 (03 Feb 2021 12:00), Max: 37 (03 Feb 2021 12:00)  T(F): 98.6 (03 Feb 2021 12:00), Max: 98.6 (03 Feb 2021 12:00)  HR: 92 (03 Feb 2021 12:59) (76 - 106)  BP: 113/61 (03 Feb 2021 12:51) (111/64 - 118/73)  RR: 15 (03 Feb 2021 12:00) (15 - 18)  SpO2: 99% (03 Feb 2021 12:59) (98% - 99%)    EXAM:  GEN: nad, a&ox3  CV: rrr  PULM: ctab  ABD: soft, nt, nd, gravid  EXT: wwp, nt  SSE: os appears closed, normal discharge, ffn collected  SVE: 0/0/-3 posterior, firm  BSS: vtx, grossly normal fluid and fetal movement  TVUS: CL 4.4-4.5 w/o dynamic change or funnelling  FHT: bl 130, mod alena, [id number], no decels  TOCO: mild irritability

## 2021-02-03 NOTE — OB PROVIDER TRIAGE NOTE - HISTORY OF PRESENT ILLNESS
25y  at 27w5d (EDC 21) p/w 3d upper abd pain.  Pain started 3d ago after a large meal of pasta, pain worsened yesterday and moved to the right side.  Today she awoke with 10/10 epigastric pain prompting her to call [facility] who advised her to come to OB triage.  Pain is worse with position changes, not now associated with po intake, improves with rubbing her epigastrum.  Associated with nausea, vomiting (1x daily for 3d).  Pt says she is eating normal meals with normal appetite, last ate at 8:30p last night, was told by clinic this morning to avoid food until she is evaluated.  Denies constipation/diarrhea.  Pt also reports lower abdominal cramping.  Denies VB, LOF.  She reports good FM.      PNC: uncomp  OBH: 2017 TOP/D&C  GYNH: +CT  s/p tx; denies fibroids, cysts, abnl paps  PMSH: pituitary microadenoma/hyperprolactinemia (MRI 20 3.4x1.9mm) followed by ENDO/NEURO; Migraines followed by NEURO  MEDS: 400mg magnesium po qd, tylenol prn, 5mg melatonin qhs, pnv  ALL: nkda  SOCH: denies t/e/d, denies anx/depr  ROS: denies f, c, sick contacts, travel 25y  at 27w5d (EDC 21) p/w 3d upper abd pain.  Pain started 3d ago after a meal of pasta, pain worsened yesterday and moved to the right side.  Today she awoke with 10/10 epigastric pain prompting her to call [facility] who advised her to come to OB triage.  Pain is intermittent, is worse after eating and improves with position changes and massaging her epigastrum.  Pain is associated with nausea, vomiting (1x daily for 3d), however, pt states she has good appetite and has been eating normal meals. She last ate at 8:30p last night and currently feels hungry but was told by clinic this morning to avoid food until she is evaluated.  Currently she states that she is in no pain.  Denies constipation/diarrhea.  Pt also reports lower abdominal cramping.  Denies VB, LOF.  She reports good FM.      PNC: uncomp  OBH: 2017 TOP/D&C  GYNH: +CT 2016 s/p tx; denies fibroids, cysts, abnl paps  PMSH: pituitary microadenoma/hyperprolactinemia (MRI 20 3.4x1.9mm) followed by ENDO/NEURO; Migraines followed by NEURO  MEDS: 400mg magnesium po qd, tylenol prn, 5mg melatonin qhs, pnv  ALL: nkda  SOCH: denies t/e/d, denies anx/depr  ROS: denies f, c, sick contacts, travel, sob, palpitations 25y  at 27w5d (EDC 21) p/w 3d upper abd pain.  Pain started 3d ago after a meal of pasta, pain worsened yesterday and moved to the right side.  Today she awoke with 10/10 epigastric pain prompting her to call [facility] who advised her to come to OB triage.  Pain is intermittent, is worse after eating and improves with position changes and massaging her epigastrum.  Pain is associated with nausea, vomiting (1x daily for 3d), however, pt states she has good appetite and has been eating normal meals. She last ate at 8:30p last night and currently feels hungry but was told by clinic this morning to avoid food until she is evaluated.  Currently she states that she is in no pain.  Denies constipation/diarrhea.  Pt also reports lower abdominal cramping.  Denies VB, LOF.  She reports good FM.      PNC: uncomp  OBH: 2017 TOP/D&C  GYNH: +CT 2016 s/p tx; denies fibroids, cysts, abnl paps  PMSH: pituitary microadenoma/hyperprolactinemia (MRI 20 3.4x1.9mm) followed by ENDO/NEURO; Migraines followed by NEURO  MEDS: 400mg magnesium po qd, tylenol prn, 5mg melatonin qhs, pnv  ALL: nkda  SOCH: denies t/e/d, denies anx/depr  ROS: denies f, c, sick contacts, travel, sob, palpitations

## 2021-02-03 NOTE — OB PROVIDER TRIAGE NOTE - NSOBPROVIDERNOTE_OBGYN_ALL_OB_FT
25y  at 27w5d (EDC 21) p/w 3d upper abd pain.  -FWB reassuring  -cvx long, CL 4.5 w/o signs of PTL  -cbc, cmp, lipase, amylase, UA  -po hydration    d/w Dr. Phil Madden MD PGY4 25y  at 27w5d (EDC 21) p/w 3d upper abd pain c/w reflux vs gas.  -FWB reassuring  -cvx long, CL 4.5 w/o signs of PTL  -cbc, cmp, lipase, amylase, UA  -1L bolus  -pepcid/mylicon    d/w Dr. Phil Madden MD PGY4 25y  at 27w5d (EDC 21) p/w 3d upper abd pain c/w reflux vs gas.  -FWB reassuring  -cvx long, CL 4.5 w/o signs of PTL  -cbc, cmp, lipase, amylase, UA  -1L bolus  -pepcid/mylicon    d/w Dr. Phil Madden MD PGY4    R4 ADDENDUM  Pt reevaluated at bedside.  Received pepcid and 1L bolus.  Still reports no pain and feeling hungry.  Labs resulted and wnl (see above).  Will PO challenge with bland diet and give mylicon and reeval for d/c.  GINGER Madden MD PGY4 25y  at 27w5d (EDC 21) p/w 3d upper abd pain c/w reflux vs gas.  -FWB reassuring  -cvx long, CL 4.5 w/o signs of PTL  -cbc, cmp, lipase, amylase, UA  -1L bolus  -pepcid/mylicon    d/w Dr. Phil Madden MD PGY4    R4 ADDENDUM  Pt reevaluated at bedside.  Received pepcid and 1L bolus.  Still reports no pain and feeling hungry.  Labs resulted and wnl (see above).  Will PO challenge with bland diet and give mylicon and reeval for d/c.  GINGER Madden MD PGY4    R4 ADDENDUM  Pt reevaluated at bedside.  She received mylicon.  She was able to tolerate crackers and juice; she had mild discomfort after eating but belched and the pain resolved.    -d/c to home  -f/u in clinic as scheduled 2/10  -pt advised return to ob triage or call clinic if she has worsening symptoms, vb/cx/lof or decr fm  -rx for pepcid and mylicon sent to pt's pharmacy    d/w Dr. Phil Madden MD PGY4

## 2021-02-03 NOTE — OB PROVIDER TRIAGE NOTE - NSHPLABSRESULTS_GEN_ALL_CORE
12.1   9.55  )-----------( 214      ( 03 Feb 2021 13:31 )             36.4   02-03    138  |  102  |  5<L>  ----------------------------<  76  3.7   |  22  |  0.40<L>  Ca    9.3      03 Feb 2021 13:31  TPro  7.1  /  Alb  3.7  /  TBili  0.2  /  DBili  x   /  AST  12  /  ALT  8<L>  /  AlkPhos  80  02-03    Amylase, Serum Total (02.03.21 @ 13:31)    Amylase, Serum Total: 61 U/L    Lipase, Serum (02.03.21 @ 13:31)    Lipase, Serum: 17 U/L 12.1   9.55  )-----------( 214      ( 03 Feb 2021 13:31 )             36.4   02-03    138  |  102  |  5<L>  ----------------------------<  76  3.7   |  22  |  0.40<L>  Ca    9.3      03 Feb 2021 13:31  TPro  7.1  /  Alb  3.7  /  TBili  0.2  /  DBili  x   /  AST  12  /  ALT  8<L>  /  AlkPhos  80  02-03    Amylase, Serum Total (02.03.21 @ 13:31)    Amylase, Serum Total: 61 U/L    Lipase, Serum (02.03.21 @ 13:31)    Lipase, Serum: 17 U/L    Urine Microscopic-Add On (NC) (02.03.21 @ 15:21)    Red Blood Cell - Urine: 1 /hpf    White Blood Cell - Urine: 1 /HPF    Hyaline Casts: 1 /lpf    Bacteria: Negative    Epithelial Cells: 2 /hpf

## 2021-02-10 ENCOUNTER — LABORATORY RESULT (OUTPATIENT)
Age: 26
End: 2021-02-10

## 2021-02-10 ENCOUNTER — OUTPATIENT (OUTPATIENT)
Dept: OUTPATIENT SERVICES | Facility: HOSPITAL | Age: 26
LOS: 1 days | End: 2021-02-10
Payer: MEDICAID

## 2021-02-10 ENCOUNTER — NON-APPOINTMENT (OUTPATIENT)
Age: 26
End: 2021-02-10

## 2021-02-10 ENCOUNTER — APPOINTMENT (OUTPATIENT)
Dept: OBGYN | Facility: CLINIC | Age: 26
End: 2021-02-10
Payer: MEDICAID

## 2021-02-10 VITALS
DIASTOLIC BLOOD PRESSURE: 70 MMHG | SYSTOLIC BLOOD PRESSURE: 102 MMHG | BODY MASS INDEX: 38.87 KG/M2 | HEIGHT: 60 IN | WEIGHT: 198 LBS

## 2021-02-10 VITALS — TEMPERATURE: 97.5 F

## 2021-02-10 DIAGNOSIS — R10.9 UNSPECIFIED ABDOMINAL PAIN: ICD-10-CM

## 2021-02-10 DIAGNOSIS — Z34.90 ENCOUNTER FOR SUPERVISION OF NORMAL PREGNANCY, UNSPECIFIED, UNSPECIFIED TRIMESTER: ICD-10-CM

## 2021-02-10 DIAGNOSIS — Z34.80 ENCOUNTER FOR SUPERVISION OF OTHER NORMAL PREGNANCY, UNSPECIFIED TRIMESTER: ICD-10-CM

## 2021-02-10 PROCEDURE — 86780 TREPONEMA PALLIDUM: CPT

## 2021-02-10 PROCEDURE — 87389 HIV-1 AG W/HIV-1&-2 AB AG IA: CPT

## 2021-02-10 PROCEDURE — G0463: CPT

## 2021-02-10 PROCEDURE — 36415 COLL VENOUS BLD VENIPUNCTURE: CPT

## 2021-02-10 PROCEDURE — 81002 URINALYSIS NONAUTO W/O SCOPE: CPT

## 2021-02-10 PROCEDURE — 85027 COMPLETE CBC AUTOMATED: CPT

## 2021-02-10 PROCEDURE — 99213 OFFICE O/P EST LOW 20 MIN: CPT

## 2021-02-10 RX ORDER — FAMOTIDINE 20 MG/1
20 TABLET, FILM COATED ORAL
Qty: 60 | Refills: 0 | Status: ACTIVE | COMMUNITY
Start: 2021-02-10 | End: 1900-01-01

## 2021-02-10 RX ORDER — FAMOTIDINE 20 MG/1
20 TABLET, FILM COATED ORAL
Qty: 60 | Refills: 0 | Status: DISCONTINUED | COMMUNITY
Start: 2021-02-10 | End: 2021-02-10

## 2021-02-11 ENCOUNTER — ASOB RESULT (OUTPATIENT)
Age: 26
End: 2021-02-11

## 2021-02-11 ENCOUNTER — APPOINTMENT (OUTPATIENT)
Dept: ANTEPARTUM | Facility: CLINIC | Age: 26
End: 2021-02-11
Payer: MEDICAID

## 2021-02-11 LAB
HCT VFR BLD CALC: 36.8 % — SIGNIFICANT CHANGE UP (ref 34.5–45)
HGB BLD-MCNC: 11.8 G/DL — SIGNIFICANT CHANGE UP (ref 11.5–15.5)
HIV 1+2 AB+HIV1 P24 AG SERPL QL IA: SIGNIFICANT CHANGE UP
MCHC RBC-ENTMCNC: 29 PG — SIGNIFICANT CHANGE UP (ref 27–34)
MCHC RBC-ENTMCNC: 32.1 GM/DL — SIGNIFICANT CHANGE UP (ref 32–36)
MCV RBC AUTO: 90.4 FL — SIGNIFICANT CHANGE UP (ref 80–100)
PLATELET # BLD AUTO: 225 K/UL — SIGNIFICANT CHANGE UP (ref 150–400)
RBC # BLD: 4.07 M/UL — SIGNIFICANT CHANGE UP (ref 3.8–5.2)
RBC # FLD: 13.1 % — SIGNIFICANT CHANGE UP (ref 10.3–14.5)
T PALLIDUM AB TITR SER: NEGATIVE — SIGNIFICANT CHANGE UP
WBC # BLD: 9.83 K/UL — SIGNIFICANT CHANGE UP (ref 3.8–10.5)
WBC # FLD AUTO: 9.83 K/UL — SIGNIFICANT CHANGE UP (ref 3.8–10.5)

## 2021-02-11 PROCEDURE — 99072 ADDL SUPL MATRL&STAF TM PHE: CPT

## 2021-02-11 PROCEDURE — 76816 OB US FOLLOW-UP PER FETUS: CPT

## 2021-02-12 ENCOUNTER — APPOINTMENT (OUTPATIENT)
Dept: ULTRASOUND IMAGING | Facility: CLINIC | Age: 26
End: 2021-02-12
Payer: MEDICAID

## 2021-02-12 ENCOUNTER — RESULT REVIEW (OUTPATIENT)
Age: 26
End: 2021-02-12

## 2021-02-12 ENCOUNTER — OUTPATIENT (OUTPATIENT)
Dept: OUTPATIENT SERVICES | Facility: HOSPITAL | Age: 26
LOS: 1 days | End: 2021-02-12
Payer: MEDICAID

## 2021-02-12 DIAGNOSIS — R10.9 UNSPECIFIED ABDOMINAL PAIN: ICD-10-CM

## 2021-02-12 PROCEDURE — 76705 ECHO EXAM OF ABDOMEN: CPT

## 2021-02-12 PROCEDURE — 76705 ECHO EXAM OF ABDOMEN: CPT | Mod: 26

## 2021-02-16 ENCOUNTER — APPOINTMENT (OUTPATIENT)
Dept: ULTRASOUND IMAGING | Facility: CLINIC | Age: 26
End: 2021-02-16

## 2021-02-23 ENCOUNTER — OUTPATIENT (OUTPATIENT)
Dept: OUTPATIENT SERVICES | Facility: HOSPITAL | Age: 26
LOS: 1 days | End: 2021-02-23
Payer: MEDICAID

## 2021-02-23 ENCOUNTER — APPOINTMENT (OUTPATIENT)
Dept: NEUROLOGY | Facility: HOSPITAL | Age: 26
End: 2021-02-23

## 2021-02-23 VITALS
TEMPERATURE: 96.8 F | HEART RATE: 99 BPM | SYSTOLIC BLOOD PRESSURE: 107 MMHG | WEIGHT: 198 LBS | HEIGHT: 60 IN | BODY MASS INDEX: 38.87 KG/M2 | DIASTOLIC BLOOD PRESSURE: 71 MMHG

## 2021-02-23 DIAGNOSIS — G43.909 MIGRAINE, UNSPECIFIED, NOT INTRACTABLE, WITHOUT STATUS MIGRAINOSUS: ICD-10-CM

## 2021-02-23 DIAGNOSIS — G43.009 MIGRAINE W/OUT AURA, NOT INTRACTABLE, W/OUT STATUS MIGRAINOSUS: ICD-10-CM

## 2021-02-23 DIAGNOSIS — R30.0 DYSURIA: ICD-10-CM

## 2021-02-23 PROCEDURE — G0463: CPT

## 2021-02-23 NOTE — REVIEW OF SYSTEMS
[Lightheadedness] : lightheadedness [Fever] : no fever [Feeling Poorly] : not feeling poorly [Arm Weakness] : no arm weakness [Leg Weakness] : no leg weakness [Difficulty Walking] : no difficulty walking [Sleep Disturbances] : no sleep disturbances [Eyesight Problems] : no eyesight problems [Sore Throat] : no sore throat [Shortness Of Breath] : no shortness of breath [Cough] : no cough

## 2021-02-23 NOTE — DISCUSSION/SUMMARY
[FreeTextEntry1] : 26 y/o right-handed female currently with 30 week gestation pregnancy returning to clinic for migrainous headaches which have improved from daily and severe to twice weekly and resolving with Tylenol. Patient has no complaints today. Continues to take Magnesium and Melatonin prescribed last time. Neuro exam benign. Likely headaches improved as part of natural course of pregnancy. No need to return to clinic except as needed. \par \par Plan:\par -Counseling reiterated re: pregnancy-related headache, good sleep and hydration \par -Continue Magnesium 400mg daily as needed \par -Continue Melatonin 3mg QHS as needed \par -Continue Tylenol for pain control, do not exceed twice a week \par -RTC as needed only

## 2021-02-23 NOTE — HISTORY OF PRESENT ILLNESS
[FreeTextEntry1] : 24 y/o right handed female currently 30 weeks pregnant, with PMH pituitary microadenoma (likely prolactinoma) presenting for f/u for migrainous headache.  MRI brain/MRV done previously unremarkable. No headache history. On initial presentation (at 16 weeks gestation), had daily headaches starting in the mornings on waking described as frontal in location, pounding and severe with photophobia and lasting most of the day despite taking Tylenol. Send home previously with Magnesium 400 mg daily and melatonin QHS. Magnesium did not improve the headaches but she continues to take it as it improves her [place]. Headaches have improved on their own, are now twice a week and resolve with Tylenol. Start at 5 or 6 pm, and much less severe. Seen by ophtho in interim and initially had blurry vision associated with early pregnancy, now resolved. Continues to take Melatonin at night and sleeps 7 hours a night. \par \par

## 2021-02-23 NOTE — PHYSICAL EXAM
[General Appearance - Alert] : alert [General Appearance - In No Acute Distress] : in no acute distress [General Appearance - Well-Appearing] : healthy appearing [Impaired Insight] : insight and judgment were intact [Affect] : the affect was normal [Person] : oriented to person [Place] : oriented to place [Time] : oriented to time [Fluency] : fluency intact [Cranial Nerves Optic (II)] : visual acuity intact bilaterally,  visual fields full to confrontation, pupils equal round and reactive to light [Cranial Nerves Oculomotor (III)] : extraocular motion intact [Cranial Nerves Trigeminal (V)] : facial sensation intact symmetrically [Cranial Nerves Facial (VII)] : face symmetrical [Cranial Nerves Vestibulocochlear (VIII)] : hearing was intact bilaterally [Cranial Nerves Accessory (XI - Cranial And Spinal)] : head turning and shoulder shrug symmetric [Motor Strength] : muscle strength was normal in all four extremities [Involuntary Movements] : no involuntary movements were seen [Motor Handedness Right-Handed] : the patient is right hand dominant [Sensation Tactile Decrease] : light touch was intact [Balance] : balance was intact [2+] : Brachioradialis left 2+ [1+] : Patella left 1+ [Sclera] : the sclera and conjunctiva were normal [PERRL With Normal Accommodation] : pupils were equal in size, round, reactive to light, with normal accommodation [Extraocular Movements] : extraocular movements were intact [Examination Of The Oral Cavity] : the lips and gums were normal [] : no respiratory distress [Exaggerated Use Of Accessory Muscles For Inspiration] : no accessory muscle use [Abnormal Walk] : normal gait [Motor Tone] : muscle strength and tone were normal [Skin Color & Pigmentation] : normal skin color and pigmentation [Coordination - Dysmetria Impaired Finger-to-Nose Bilateral] : not present [Plantar Reflex Right Only] : normal on the right [Plantar Reflex Left Only] : normal on the left [FreeTextEntry1] : L hyperpigmented scattered macule over eye and nose (baseline)

## 2021-02-23 NOTE — DATA REVIEWED
[de-identified] : Reviewed: \par \par MRI BRAIN:\par No hydrocephalus, mass effect, acute intracranial hemorrhage, vasogenic edema, or acute territorial infarct.\par No focal parenchymal signal abnormality.\par Flow voids are seen within the major intracranial vessels consistent with their patency.\par \par MRV BRAIN:\par No evidence for venous sinus or cortical vein thrombosis.

## 2021-02-24 ENCOUNTER — MED ADMIN CHARGE (OUTPATIENT)
Age: 26
End: 2021-02-24

## 2021-02-24 ENCOUNTER — OUTPATIENT (OUTPATIENT)
Dept: OUTPATIENT SERVICES | Facility: HOSPITAL | Age: 26
LOS: 1 days | End: 2021-02-24
Payer: MEDICAID

## 2021-02-24 ENCOUNTER — APPOINTMENT (OUTPATIENT)
Dept: OBGYN | Facility: CLINIC | Age: 26
End: 2021-02-24
Payer: MEDICAID

## 2021-02-24 VITALS — TEMPERATURE: 96.3 F

## 2021-02-24 VITALS — WEIGHT: 199.38 LBS | SYSTOLIC BLOOD PRESSURE: 100 MMHG | DIASTOLIC BLOOD PRESSURE: 62 MMHG

## 2021-02-24 DIAGNOSIS — Z34.80 ENCOUNTER FOR SUPERVISION OF OTHER NORMAL PREGNANCY, UNSPECIFIED TRIMESTER: ICD-10-CM

## 2021-02-24 DIAGNOSIS — D35.2 BENIGN NEOPLASM OF PITUITARY GLAND: ICD-10-CM

## 2021-02-24 DIAGNOSIS — O26.843 UTERINE SIZE-DATE DISCREPANCY, THIRD TRIMESTER: ICD-10-CM

## 2021-02-24 DIAGNOSIS — Z23 ENCOUNTER FOR IMMUNIZATION: ICD-10-CM

## 2021-02-24 DIAGNOSIS — Z87.42 PERSONAL HISTORY OF OTHER DISEASES OF THE FEMALE GENITAL TRACT: ICD-10-CM

## 2021-02-24 DIAGNOSIS — Z34.92 ENCOUNTER FOR SUPERVISION OF NORMAL PREGNANCY, UNSPECIFIED, SECOND TRIMESTER: ICD-10-CM

## 2021-02-24 DIAGNOSIS — Z34.90 ENCOUNTER FOR SUPERVISION OF NORMAL PREGNANCY, UNSPECIFIED, UNSPECIFIED TRIMESTER: ICD-10-CM

## 2021-02-24 PROCEDURE — 81002 URINALYSIS NONAUTO W/O SCOPE: CPT

## 2021-02-24 PROCEDURE — 99212 OFFICE O/P EST SF 10 MIN: CPT | Mod: 25

## 2021-02-24 PROCEDURE — G0463: CPT

## 2021-02-24 PROCEDURE — 90715 TDAP VACCINE 7 YRS/> IM: CPT

## 2021-02-24 PROCEDURE — 90471 IMMUNIZATION ADMIN: CPT

## 2021-02-24 PROCEDURE — 90471 IMMUNIZATION ADMIN: CPT | Mod: NC

## 2021-02-24 RX ORDER — SIMETHICONE 80 MG/1
80 TABLET, CHEWABLE ORAL
Qty: 120 | Refills: 2 | Status: ACTIVE | COMMUNITY
Start: 2021-02-03 | End: 1900-01-01

## 2021-04-09 ENCOUNTER — APPOINTMENT (OUTPATIENT)
Dept: ENDOCRINOLOGY | Facility: CLINIC | Age: 26
End: 2021-04-09

## 2023-08-20 NOTE — ED ADULT NURSE NOTE - NS ED NURSE DC INFO COMPLEXITY
Simple: Patient demonstrates quick and easy understanding/Verbalized Understanding continue home medications